# Patient Record
Sex: FEMALE | Race: WHITE | NOT HISPANIC OR LATINO | Employment: FULL TIME | URBAN - METROPOLITAN AREA
[De-identification: names, ages, dates, MRNs, and addresses within clinical notes are randomized per-mention and may not be internally consistent; named-entity substitution may affect disease eponyms.]

---

## 2020-12-04 DIAGNOSIS — Z01.84 ANTIBODY RESPONSE EXAMINATION: ICD-10-CM

## 2021-12-21 ENCOUNTER — LAB VISIT (OUTPATIENT)
Dept: PRIMARY CARE CLINIC | Facility: OTHER | Age: 26
End: 2021-12-21
Payer: COMMERCIAL

## 2021-12-21 DIAGNOSIS — J02.9 SORE THROAT: ICD-10-CM

## 2021-12-21 DIAGNOSIS — J02.9 SORE THROAT: Primary | ICD-10-CM

## 2021-12-21 LAB
CTP QC/QA: YES
SARS-COV-2 RDRP RESP QL NAA+PROBE: NEGATIVE

## 2021-12-21 PROCEDURE — U0002 COVID-19 LAB TEST NON-CDC: HCPCS | Mod: QW,,, | Performed by: PREVENTIVE MEDICINE

## 2021-12-21 PROCEDURE — U0002: ICD-10-PCS | Mod: QW,,, | Performed by: PREVENTIVE MEDICINE

## 2022-01-20 ENCOUNTER — TELEPHONE (OUTPATIENT)
Dept: OBSTETRICS AND GYNECOLOGY | Facility: CLINIC | Age: 27
End: 2022-01-20
Payer: COMMERCIAL

## 2022-01-20 DIAGNOSIS — Z34.90 PREGNANCY, UNSPECIFIED GESTATIONAL AGE: Primary | ICD-10-CM

## 2022-01-31 ENCOUNTER — LAB VISIT (OUTPATIENT)
Dept: LAB | Facility: HOSPITAL | Age: 27
End: 2022-01-31
Attending: OBSTETRICS & GYNECOLOGY
Payer: COMMERCIAL

## 2022-01-31 ENCOUNTER — TELEPHONE (OUTPATIENT)
Dept: OBSTETRICS AND GYNECOLOGY | Facility: CLINIC | Age: 27
End: 2022-01-31
Payer: COMMERCIAL

## 2022-01-31 DIAGNOSIS — O20.9 VAGINAL BLEEDING AFFECTING EARLY PREGNANCY: Primary | ICD-10-CM

## 2022-01-31 DIAGNOSIS — O20.9 VAGINAL BLEEDING AFFECTING EARLY PREGNANCY: ICD-10-CM

## 2022-01-31 LAB
HCG INTACT+B SERPL-ACNC: 23 MIU/ML
PROGEST SERPL-MCNC: 0.7 NG/ML

## 2022-01-31 PROCEDURE — 84702 CHORIONIC GONADOTROPIN TEST: CPT | Performed by: OBSTETRICS & GYNECOLOGY

## 2022-01-31 PROCEDURE — 36415 COLL VENOUS BLD VENIPUNCTURE: CPT | Performed by: OBSTETRICS & GYNECOLOGY

## 2022-01-31 PROCEDURE — 84144 ASSAY OF PROGESTERONE: CPT | Performed by: OBSTETRICS & GYNECOLOGY

## 2022-01-31 NOTE — TELEPHONE ENCOUNTER
Scheduled pt per Dr. Moon.  Since getting off of the phone, pt has passed more clots.  Thinks she miscarried.    Advised we will confirm with labs that she will do today.

## 2022-01-31 NOTE — TELEPHONE ENCOUNTER
NEW pt, ~5 and 6 OB pt c/o that last night when she wiped, she had some light pink spotting.  Throughout the night the spotting turned to a darker brown and then before bed it was a bright red.  Also reports cramps last night.  Took Tylenol and use a heat pad for discomfort.  This morning, cramping has subsided but spotting is still present only when she urinates or wiping.  Had intercourse 5 days ago.    Confirm appt on 2/18    Advised that in general, spotting and cramping can be normal in early pregnancy d/t the vascularity of the cervix during pregnancy.  Advised to increase fluids.  Will check with Dr. Moon to see if she wants labs or to come in sooner.    Please advise, thanks!

## 2022-02-01 ENCOUNTER — LAB VISIT (OUTPATIENT)
Dept: LAB | Facility: HOSPITAL | Age: 27
End: 2022-02-01
Attending: OBSTETRICS & GYNECOLOGY
Payer: COMMERCIAL

## 2022-02-01 ENCOUNTER — OFFICE VISIT (OUTPATIENT)
Dept: OBSTETRICS AND GYNECOLOGY | Facility: CLINIC | Age: 27
End: 2022-02-01
Payer: COMMERCIAL

## 2022-02-01 VITALS
WEIGHT: 119.38 LBS | BODY MASS INDEX: 22.54 KG/M2 | HEIGHT: 61 IN | SYSTOLIC BLOOD PRESSURE: 98 MMHG | DIASTOLIC BLOOD PRESSURE: 70 MMHG

## 2022-02-01 DIAGNOSIS — O03.9 MISCARRIAGE: ICD-10-CM

## 2022-02-01 DIAGNOSIS — Z67.91 RH NEGATIVE STATE IN ANTEPARTUM PERIOD: ICD-10-CM

## 2022-02-01 DIAGNOSIS — O26.899 RH NEGATIVE STATE IN ANTEPARTUM PERIOD: ICD-10-CM

## 2022-02-01 DIAGNOSIS — N92.6 MISSED PERIOD: Primary | ICD-10-CM

## 2022-02-01 DIAGNOSIS — N92.6 MISSED PERIOD: ICD-10-CM

## 2022-02-01 LAB
ABO + RH BLD: NORMAL
BLD GP AB SCN CELLS X3 SERPL QL: NORMAL

## 2022-02-01 PROCEDURE — 99203 OFFICE O/P NEW LOW 30 MIN: CPT | Mod: 25,S$GLB,, | Performed by: OBSTETRICS & GYNECOLOGY

## 2022-02-01 PROCEDURE — 3078F DIAST BP <80 MM HG: CPT | Mod: CPTII,S$GLB,, | Performed by: OBSTETRICS & GYNECOLOGY

## 2022-02-01 PROCEDURE — 3074F SYST BP LT 130 MM HG: CPT | Mod: CPTII,S$GLB,, | Performed by: OBSTETRICS & GYNECOLOGY

## 2022-02-01 PROCEDURE — 96372 THER/PROPH/DIAG INJ SC/IM: CPT | Mod: S$GLB,,, | Performed by: OBSTETRICS & GYNECOLOGY

## 2022-02-01 PROCEDURE — 1159F MED LIST DOCD IN RCRD: CPT | Mod: CPTII,S$GLB,, | Performed by: OBSTETRICS & GYNECOLOGY

## 2022-02-01 PROCEDURE — 86901 BLOOD TYPING SEROLOGIC RH(D): CPT | Performed by: OBSTETRICS & GYNECOLOGY

## 2022-02-01 PROCEDURE — 3078F PR MOST RECENT DIASTOLIC BLOOD PRESSURE < 80 MM HG: ICD-10-PCS | Mod: CPTII,S$GLB,, | Performed by: OBSTETRICS & GYNECOLOGY

## 2022-02-01 PROCEDURE — 99999 PR PBB SHADOW E&M-EST. PATIENT-LVL III: ICD-10-PCS | Mod: PBBFAC,,, | Performed by: OBSTETRICS & GYNECOLOGY

## 2022-02-01 PROCEDURE — 3074F PR MOST RECENT SYSTOLIC BLOOD PRESSURE < 130 MM HG: ICD-10-PCS | Mod: CPTII,S$GLB,, | Performed by: OBSTETRICS & GYNECOLOGY

## 2022-02-01 PROCEDURE — 1160F RVW MEDS BY RX/DR IN RCRD: CPT | Mod: CPTII,S$GLB,, | Performed by: OBSTETRICS & GYNECOLOGY

## 2022-02-01 PROCEDURE — 3008F BODY MASS INDEX DOCD: CPT | Mod: CPTII,S$GLB,, | Performed by: OBSTETRICS & GYNECOLOGY

## 2022-02-01 PROCEDURE — 3008F PR BODY MASS INDEX (BMI) DOCUMENTED: ICD-10-PCS | Mod: CPTII,S$GLB,, | Performed by: OBSTETRICS & GYNECOLOGY

## 2022-02-01 PROCEDURE — 99203 PR OFFICE/OUTPT VISIT, NEW, LEVL III, 30-44 MIN: ICD-10-PCS | Mod: 25,S$GLB,, | Performed by: OBSTETRICS & GYNECOLOGY

## 2022-02-01 PROCEDURE — 96372 RHO (D) IMMUNE GLOBULIN: ICD-10-PCS | Mod: S$GLB,,, | Performed by: OBSTETRICS & GYNECOLOGY

## 2022-02-01 PROCEDURE — 99999 PR PBB SHADOW E&M-EST. PATIENT-LVL III: CPT | Mod: PBBFAC,,, | Performed by: OBSTETRICS & GYNECOLOGY

## 2022-02-01 PROCEDURE — 1159F PR MEDICATION LIST DOCUMENTED IN MEDICAL RECORD: ICD-10-PCS | Mod: CPTII,S$GLB,, | Performed by: OBSTETRICS & GYNECOLOGY

## 2022-02-01 PROCEDURE — 86850 RBC ANTIBODY SCREEN: CPT | Performed by: OBSTETRICS & GYNECOLOGY

## 2022-02-01 PROCEDURE — 1160F PR REVIEW ALL MEDS BY PRESCRIBER/CLIN PHARMACIST DOCUMENTED: ICD-10-PCS | Mod: CPTII,S$GLB,, | Performed by: OBSTETRICS & GYNECOLOGY

## 2022-02-01 RX ORDER — CITALOPRAM 20 MG/1
20 TABLET, FILM COATED ORAL DAILY
COMMUNITY
Start: 2021-09-25 | End: 2022-03-31

## 2022-02-01 NOTE — PROGRESS NOTES
Ordering Provider: Dr. Moon     Patient here to receive Rhogam to RIGHT upper outer glute. Tolerated well, no reaction noted. Instructed to wait 15 minutes after administration for monitoring.      Pre pain scale: none     Post pain scale: none

## 2022-02-01 NOTE — PROGRESS NOTES
Subjective:       Patient ID: Leticia Pisano is a 26 y.o. female.    Chief Complaint:  Possible Pregnancy and Injections (Rhogam)      History of Present Illness  27 yo  with +UPT 22 and bleeding/cramping yesterday like a period. She reports passed several clots yesterday afternoon/evening. Now only spotting and no cramps. She has been off OCP since August and reported cycles for remainder of year were about 40 days apart. She did an ovulation test after LMP 21 and was positive OPK on  (day 18 of cycle). She has a history of anxiety and took Celexa but stopped two months ago and doing well off medication. Patient reports blood type is Rh negative.  with her today at visit.    OB History        1    Para        Term                AB        Living           SAB        IAB        Ectopic        Multiple        Live Births                     GYN History  Age of Menarche:14  No abnormal pap or STDs    Past Medical History:   Diagnosis Date    Anxiety        Past Surgical History:   Procedure Laterality Date    AUGMENTATION OF BREAST          Family History   Problem Relation Age of Onset    Diabetes Father         Social History     Socioeconomic History    Marital status:    Tobacco Use    Smoking status: Never Smoker    Smokeless tobacco: Never Used   Substance and Sexual Activity    Alcohol use: Never    Drug use: Never    Sexual activity: Yes     Partners: Male     Birth control/protection: None        Review of Systems  Review of Systems   Constitutional: Negative for chills and fever.   Respiratory: Negative for chest tightness and shortness of breath.    Cardiovascular: Negative for chest pain.   Gastrointestinal: Negative for abdominal distention, abdominal pain, constipation, diarrhea, nausea and vomiting.   Endocrine: Negative for cold intolerance and heat intolerance.   Genitourinary: Positive for pelvic pain (yesterday with heavy bleeding) and  "vaginal bleeding. Negative for dyspareunia, frequency, urgency and vaginal discharge.   Skin: Negative for rash.   Hematological: Does not bruise/bleed easily.   Psychiatric/Behavioral: Negative for dysphoric mood. The patient is not nervous/anxious.         Objective:     Vitals:    22 0848   BP: 98/70   Weight: 54.2 kg (119 lb 6.1 oz)   Height: 5' 1" (1.549 m)       Physical Exam:   Constitutional: She is oriented to person, place, and time. She appears well-developed and well-nourished. No distress.       Cardiovascular: Normal rate and regular rhythm.     Pulmonary/Chest: Effort normal and breath sounds normal.        Abdominal: Soft. There is no abdominal tenderness.     Genitourinary:    Inguinal canal and rectum normal.      Pelvic exam was performed with patient supine.   The external female genitalia was normal.   No external genitalia lesions identified,Genitalia hair distrobution normal .   There is no rash or lesion on the right labia. There is no rash or lesion on the left labia. Right adnexum displays no mass, no tenderness and no fullness. Left adnexum displays no mass, no tenderness and no fullness. There is bleeding (dark blood small clots in vault) in the vagina. No  no vaginal discharge in the vagina. Cervix exhibits no motion tenderness and no discharge. Uterus is not enlarged and not tender. Urethral Meatus exhibits: urethral lesionUrethra findings: no tendernessBladder findings: no bladder tenderness              Neurological: She is alert and oriented to person, place, and time.    Skin: Skin is warm and dry. No pallor.    Psychiatric: She has a normal mood and affect.     (22)  Quant hc/Progesterone: 0.7    Assessment/ Plan:     Orders Placed This Encounter    Rho(D) Immune Globulin    POCT urine pregnancy    Stoney test, indirect, qualitative    ABO/Rh       Leticia was seen today for possible pregnancy and injections.    Diagnoses and all orders for this visit:    Missed " period  -     POCT urine pregnancy  -     Stoney test, indirect, qualitative; Future  -     ABO/Rh; Future    Rh negative state in antepartum period  -     Stoney test, indirect, qualitative; Future  -     ABO/Rh; Future  -     Rho(D) Immune Globulin    Miscarriage    Counseled on miscarriage as heavy bleeding/discharge with faint UPT and hcg of 23 yesterday afternoon. Discussed causes and expectations. Desires pregnancy and declines contraception. Patient knows Rh negative. Rhogam today and blood type done. Patient to notify us if no cycle in next 4-6 weeks. Advised to start trying again if desired after next cycle. No signs or symptoms of anemia. Patient appropriately upset by news but doing well off medication for anxiety and does not desire to restart at this time.     Follow up in about 6 months (around 8/1/2022) for Annual or sooner prn pregnancy.       Maren Moon MD    As of April 1, 2021, the Cures Act has been passed nationally. This new law requires that all doctors progress notes, lab results, pathology reports and radiology reports be released IMMEDIATELY to the patient in the patient portal. That means that the results are released to you at the EXACT same time they are released to me. Therefore, with all of the patients that I have I am not able to reply to each patient exactly when the results come in. So there will be a delay from when you see the results to when I see them and have time to come up with a response to send you. Also I only see these results when I am on the computer at work. So if the results come in over the weekend or after 5 pm of a work day, I will not see them until the next business day. As you can tell, this is a challenge as a physician to give every patient the quick response they hope for and deserve. So please be patient!   Thanks for your understanding and patience.

## 2022-02-02 ENCOUNTER — CLINICAL SUPPORT (OUTPATIENT)
Dept: OTHER | Facility: CLINIC | Age: 27
End: 2022-02-02
Payer: COMMERCIAL

## 2022-02-02 DIAGNOSIS — Z00.8 ENCOUNTER FOR OTHER GENERAL EXAMINATION: ICD-10-CM

## 2022-02-03 VITALS — BODY MASS INDEX: 22.56 KG/M2 | HEIGHT: 61 IN

## 2022-02-03 LAB
GLUCOSE SERPL-MCNC: 89 MG/DL (ref 60–140)
HDLC SERPL-MCNC: 65 MG/DL
POC CHOLESTEROL, LDL (DOCK): 76 MG/DL
POC CHOLESTEROL, TOTAL: 159 MG/DL
TRIGL SERPL-MCNC: 90 MG/DL

## 2022-03-04 ENCOUNTER — TELEPHONE (OUTPATIENT)
Dept: OBSTETRICS AND GYNECOLOGY | Facility: CLINIC | Age: 27
End: 2022-03-04

## 2022-03-04 ENCOUNTER — LAB VISIT (OUTPATIENT)
Dept: LAB | Facility: HOSPITAL | Age: 27
End: 2022-03-04
Attending: OBSTETRICS & GYNECOLOGY
Payer: COMMERCIAL

## 2022-03-04 DIAGNOSIS — Z32.01 POSITIVE PREGNANCY TEST: ICD-10-CM

## 2022-03-04 DIAGNOSIS — Z32.01 POSITIVE PREGNANCY TEST: Primary | ICD-10-CM

## 2022-03-04 LAB
HCG INTACT+B SERPL-ACNC: 71 MIU/ML
PROGEST SERPL-MCNC: 28.5 NG/ML

## 2022-03-04 PROCEDURE — 36415 COLL VENOUS BLD VENIPUNCTURE: CPT | Performed by: OBSTETRICS & GYNECOLOGY

## 2022-03-04 PROCEDURE — 84702 CHORIONIC GONADOTROPIN TEST: CPT | Performed by: OBSTETRICS & GYNECOLOGY

## 2022-03-04 PROCEDURE — 84144 ASSAY OF PROGESTERONE: CPT | Performed by: OBSTETRICS & GYNECOLOGY

## 2022-03-04 NOTE — TELEPHONE ENCOUNTER
Advised per Dr. Moon.  Pt stopped bleeding on 2/5 and had a negative UPT that day and days following as well.  Pt didn't have a period after the miscarriage.     Repeat HCG ordered for Monday.

## 2022-03-04 NOTE — TELEPHONE ENCOUNTER
Pt recently had a miscarriage on 1/31.  Last night had a +UPT.  Had 2 +UPT this AM on two different tests.  Denies VB or pain at this time.      HCG and progesterone labs ordered and [facility]    Do you want to do a repeat HCG on Monday?  Let me know if you recommend anything else.

## 2022-03-07 ENCOUNTER — LAB VISIT (OUTPATIENT)
Dept: LAB | Facility: HOSPITAL | Age: 27
End: 2022-03-07
Attending: OBSTETRICS & GYNECOLOGY
Payer: COMMERCIAL

## 2022-03-07 DIAGNOSIS — Z32.01 POSITIVE PREGNANCY TEST: ICD-10-CM

## 2022-03-07 PROCEDURE — 84702 CHORIONIC GONADOTROPIN TEST: CPT | Performed by: OBSTETRICS & GYNECOLOGY

## 2022-03-08 DIAGNOSIS — Z87.59 HISTORY OF MISCARRIAGE: Primary | ICD-10-CM

## 2022-03-08 LAB — HCG INTACT+B SERPL-ACNC: 311 MIU/ML

## 2022-03-08 NOTE — PROGRESS NOTES
Patient notified of results. Please schedule missed period with dating ultrasound in 2-3 weeks. Please schedule hcg next Monday. Order entered.

## 2022-03-31 ENCOUNTER — OFFICE VISIT (OUTPATIENT)
Dept: OBSTETRICS AND GYNECOLOGY | Facility: CLINIC | Age: 27
End: 2022-03-31
Payer: COMMERCIAL

## 2022-03-31 ENCOUNTER — PROCEDURE VISIT (OUTPATIENT)
Dept: OBSTETRICS AND GYNECOLOGY | Facility: CLINIC | Age: 27
End: 2022-03-31
Payer: COMMERCIAL

## 2022-03-31 VITALS
DIASTOLIC BLOOD PRESSURE: 60 MMHG | HEIGHT: 61 IN | BODY MASS INDEX: 22.87 KG/M2 | SYSTOLIC BLOOD PRESSURE: 110 MMHG | WEIGHT: 121.13 LBS

## 2022-03-31 DIAGNOSIS — Z34.90 PREGNANCY, UNSPECIFIED GESTATIONAL AGE: ICD-10-CM

## 2022-03-31 DIAGNOSIS — O28.3 ABNORMAL FETAL ULTRASOUND: Primary | ICD-10-CM

## 2022-03-31 LAB
B-HCG UR QL: POSITIVE
CTP QC/QA: YES

## 2022-03-31 PROCEDURE — 1160F RVW MEDS BY RX/DR IN RCRD: CPT | Mod: CPTII,S$GLB,, | Performed by: NURSE PRACTITIONER

## 2022-03-31 PROCEDURE — 3008F BODY MASS INDEX DOCD: CPT | Mod: CPTII,S$GLB,, | Performed by: NURSE PRACTITIONER

## 2022-03-31 PROCEDURE — 99999 PR PBB SHADOW E&M-EST. PATIENT-LVL III: CPT | Mod: PBBFAC,,, | Performed by: NURSE PRACTITIONER

## 2022-03-31 PROCEDURE — 87591 N.GONORRHOEAE DNA AMP PROB: CPT | Performed by: NURSE PRACTITIONER

## 2022-03-31 PROCEDURE — 76801 US OB/GYN EXTENDED PROCEDURE (VIEWPOINT): ICD-10-PCS | Mod: S$GLB,,, | Performed by: OBSTETRICS & GYNECOLOGY

## 2022-03-31 PROCEDURE — 99999 PR PBB SHADOW E&M-EST. PATIENT-LVL III: ICD-10-PCS | Mod: PBBFAC,,, | Performed by: NURSE PRACTITIONER

## 2022-03-31 PROCEDURE — 87491 CHLMYD TRACH DNA AMP PROBE: CPT | Performed by: NURSE PRACTITIONER

## 2022-03-31 PROCEDURE — 3074F PR MOST RECENT SYSTOLIC BLOOD PRESSURE < 130 MM HG: ICD-10-PCS | Mod: CPTII,S$GLB,, | Performed by: NURSE PRACTITIONER

## 2022-03-31 PROCEDURE — 3078F DIAST BP <80 MM HG: CPT | Mod: CPTII,S$GLB,, | Performed by: NURSE PRACTITIONER

## 2022-03-31 PROCEDURE — 76801 OB US < 14 WKS SINGLE FETUS: CPT | Mod: S$GLB,,, | Performed by: OBSTETRICS & GYNECOLOGY

## 2022-03-31 PROCEDURE — 99214 PR OFFICE/OUTPT VISIT, EST, LEVL IV, 30-39 MIN: ICD-10-PCS | Mod: S$GLB,,, | Performed by: NURSE PRACTITIONER

## 2022-03-31 PROCEDURE — 1159F MED LIST DOCD IN RCRD: CPT | Mod: CPTII,S$GLB,, | Performed by: NURSE PRACTITIONER

## 2022-03-31 PROCEDURE — 99214 OFFICE O/P EST MOD 30 MIN: CPT | Mod: S$GLB,,, | Performed by: NURSE PRACTITIONER

## 2022-03-31 PROCEDURE — 1160F PR REVIEW ALL MEDS BY PRESCRIBER/CLIN PHARMACIST DOCUMENTED: ICD-10-PCS | Mod: CPTII,S$GLB,, | Performed by: NURSE PRACTITIONER

## 2022-03-31 PROCEDURE — 81025 POCT URINE PREGNANCY: ICD-10-PCS | Mod: S$GLB,,, | Performed by: NURSE PRACTITIONER

## 2022-03-31 PROCEDURE — 3078F PR MOST RECENT DIASTOLIC BLOOD PRESSURE < 80 MM HG: ICD-10-PCS | Mod: CPTII,S$GLB,, | Performed by: NURSE PRACTITIONER

## 2022-03-31 PROCEDURE — 1159F PR MEDICATION LIST DOCUMENTED IN MEDICAL RECORD: ICD-10-PCS | Mod: CPTII,S$GLB,, | Performed by: NURSE PRACTITIONER

## 2022-03-31 PROCEDURE — 3008F PR BODY MASS INDEX (BMI) DOCUMENTED: ICD-10-PCS | Mod: CPTII,S$GLB,, | Performed by: NURSE PRACTITIONER

## 2022-03-31 PROCEDURE — 81025 URINE PREGNANCY TEST: CPT | Mod: S$GLB,,, | Performed by: NURSE PRACTITIONER

## 2022-03-31 PROCEDURE — 3074F SYST BP LT 130 MM HG: CPT | Mod: CPTII,S$GLB,, | Performed by: NURSE PRACTITIONER

## 2022-03-31 PROCEDURE — 87086 URINE CULTURE/COLONY COUNT: CPT | Performed by: NURSE PRACTITIONER

## 2022-03-31 NOTE — PROGRESS NOTES
History & Physical  Gynecology      SUBJECTIVE:     Chief Complaint: Pregnancy Confirmation       History of Present Illness: Patient presents to clinic with partner for pregnancy confirmation visit. History of miscarriage in the past 6 months. OBUS with abnormal findings today.       Review of patient's allergies indicates:  No Known Allergies    Past Medical History:   Diagnosis Date    Anxiety      Past Surgical History:   Procedure Laterality Date    AUGMENTATION OF BREAST       OB History        1    Para        Term                AB        Living           SAB        IAB        Ectopic        Multiple        Live Births                   Family History   Problem Relation Age of Onset    Diabetes Father      Social History     Tobacco Use    Smoking status: Never Smoker    Smokeless tobacco: Never Used   Substance Use Topics    Alcohol use: Never    Drug use: Never       Current Outpatient Medications   Medication Sig    prenatal 25/iron fum/folic/dha (PRENATAL-1 ORAL) Take by mouth.     No current facility-administered medications for this visit.         Review of Systems:  Review of Systems   Constitutional: Negative for activity change, appetite change, chills, fatigue and fever.   HENT: Negative for mouth sores.    Eyes: Negative for visual disturbance.   Respiratory: Negative for cough and shortness of breath.    Cardiovascular: Negative for chest pain and leg swelling.   Gastrointestinal: Negative for abdominal pain, constipation, diarrhea, nausea, vomiting and reflux.   Endocrine: Negative for hyperthyroidism and hypothyroidism.   Genitourinary: Negative for dysuria, flank pain, frequency, genital sores, hematuria, menstrual problem, pelvic pain, vaginal bleeding, vaginal discharge, vaginal pain, vaginal dryness and vaginal odor.   Musculoskeletal: Negative for arthralgias, back pain and myalgias.   Integumentary:  Negative for rash, breast mass and breast tenderness.    Neurological: Negative for headaches.   Hematological: Negative for adenopathy. Does not bruise/bleed easily.   Psychiatric/Behavioral: Negative for depression. The patient is not nervous/anxious.    Breast: Negative for asymmetry, mass and tenderness       OBJECTIVE:     Physical Exam:  Physical Exam  Vitals and nursing note reviewed.   Constitutional:       Appearance: Normal appearance.   HENT:      Head: Normocephalic and atraumatic.      Nose: Nose normal.      Mouth/Throat:      Mouth: Mucous membranes are moist.   Eyes:      Conjunctiva/sclera: Conjunctivae normal.   Cardiovascular:      Rate and Rhythm: Normal rate.   Pulmonary:      Effort: Pulmonary effort is normal.      Breath sounds: Normal breath sounds.   Abdominal:      Palpations: Abdomen is soft.   Genitourinary:     Comments: Deferred  Musculoskeletal:         General: Normal range of motion.      Cervical back: Normal range of motion.   Skin:     General: Skin is warm and dry.   Neurological:      General: No focal deficit present.      Mental Status: She is alert.   Psychiatric:         Mood and Affect: Mood normal.         Behavior: Behavior normal.         Thought Content: Thought content normal.         Judgment: Judgment normal.           ASSESSMENT:       ICD-10-CM ICD-9-CM    1. Abnormal fetal ultrasound  O28.3 796.5 Ambulatory referral/consult to Perinatology   2. Pregnancy, unspecified gestational age  Z34.90 V22.2 Urine culture      POCT urine pregnancy      C. trachomatis/N. gonorrhoeae by AMP DNA [facility]; Urine      Ambulatory referral/consult to Perinatology          Plan:      TVUS noted IUP- suspicion for conjoined twin pregnancy vs monoamniotic pregnancy with overlapping of fetal positioning. Singular heart beat is identified.     Discussed need for repeat imaging in 1-2 weeks, will assist to schedule with [facility] for visit and scan. Emotional support given.    FU with NP as needed.    Counseling time: 30 minutes      Dottie CABALLERO  PASCUAL Nieves

## 2022-04-01 LAB
BACTERIA UR CULT: NORMAL
C TRACH DNA SPEC QL NAA+PROBE: NOT DETECTED
N GONORRHOEA DNA SPEC QL NAA+PROBE: NOT DETECTED

## 2022-04-06 ENCOUNTER — PATIENT MESSAGE (OUTPATIENT)
Dept: MATERNAL FETAL MEDICINE | Facility: CLINIC | Age: 27
End: 2022-04-06
Payer: COMMERCIAL

## 2022-04-07 ENCOUNTER — PROCEDURE VISIT (OUTPATIENT)
Dept: MATERNAL FETAL MEDICINE | Facility: CLINIC | Age: 27
End: 2022-04-07
Payer: COMMERCIAL

## 2022-04-07 ENCOUNTER — OFFICE VISIT (OUTPATIENT)
Dept: MATERNAL FETAL MEDICINE | Facility: CLINIC | Age: 27
End: 2022-04-07
Payer: COMMERCIAL

## 2022-04-07 VITALS
BODY MASS INDEX: 23.11 KG/M2 | DIASTOLIC BLOOD PRESSURE: 72 MMHG | SYSTOLIC BLOOD PRESSURE: 110 MMHG | HEIGHT: 61 IN | WEIGHT: 122.38 LBS

## 2022-04-07 DIAGNOSIS — O28.3 ABNORMAL FETAL ULTRASOUND: ICD-10-CM

## 2022-04-07 DIAGNOSIS — O30.021: ICD-10-CM

## 2022-04-07 PROCEDURE — 3074F SYST BP LT 130 MM HG: CPT | Mod: CPTII,S$GLB,, | Performed by: OBSTETRICS & GYNECOLOGY

## 2022-04-07 PROCEDURE — 99204 OFFICE O/P NEW MOD 45 MIN: CPT | Mod: 25,S$GLB,, | Performed by: OBSTETRICS & GYNECOLOGY

## 2022-04-07 PROCEDURE — 1159F MED LIST DOCD IN RCRD: CPT | Mod: CPTII,S$GLB,, | Performed by: OBSTETRICS & GYNECOLOGY

## 2022-04-07 PROCEDURE — 99204 PR OFFICE/OUTPT VISIT, NEW, LEVL IV, 45-59 MIN: ICD-10-PCS | Mod: 25,S$GLB,, | Performed by: OBSTETRICS & GYNECOLOGY

## 2022-04-07 PROCEDURE — 76377 PR  3D RENDERING W/ IMAGE POSTPROCESS: ICD-10-PCS | Mod: S$GLB,,, | Performed by: OBSTETRICS & GYNECOLOGY

## 2022-04-07 PROCEDURE — 76801 OB US < 14 WKS SINGLE FETUS: CPT | Mod: S$GLB,,, | Performed by: OBSTETRICS & GYNECOLOGY

## 2022-04-07 PROCEDURE — 99999 PR PBB SHADOW E&M-EST. PATIENT-LVL III: CPT | Mod: PBBFAC,,, | Performed by: OBSTETRICS & GYNECOLOGY

## 2022-04-07 PROCEDURE — 3078F DIAST BP <80 MM HG: CPT | Mod: CPTII,S$GLB,, | Performed by: OBSTETRICS & GYNECOLOGY

## 2022-04-07 PROCEDURE — 3008F BODY MASS INDEX DOCD: CPT | Mod: CPTII,S$GLB,, | Performed by: OBSTETRICS & GYNECOLOGY

## 2022-04-07 PROCEDURE — 3078F PR MOST RECENT DIASTOLIC BLOOD PRESSURE < 80 MM HG: ICD-10-PCS | Mod: CPTII,S$GLB,, | Performed by: OBSTETRICS & GYNECOLOGY

## 2022-04-07 PROCEDURE — 1159F PR MEDICATION LIST DOCUMENTED IN MEDICAL RECORD: ICD-10-PCS | Mod: CPTII,S$GLB,, | Performed by: OBSTETRICS & GYNECOLOGY

## 2022-04-07 PROCEDURE — 99999 PR PBB SHADOW E&M-EST. PATIENT-LVL III: ICD-10-PCS | Mod: PBBFAC,,, | Performed by: OBSTETRICS & GYNECOLOGY

## 2022-04-07 PROCEDURE — 3008F PR BODY MASS INDEX (BMI) DOCUMENTED: ICD-10-PCS | Mod: CPTII,S$GLB,, | Performed by: OBSTETRICS & GYNECOLOGY

## 2022-04-07 PROCEDURE — 76377 3D RENDER W/INTRP POSTPROCES: CPT | Mod: S$GLB,,, | Performed by: OBSTETRICS & GYNECOLOGY

## 2022-04-07 PROCEDURE — 3074F PR MOST RECENT SYSTOLIC BLOOD PRESSURE < 130 MM HG: ICD-10-PCS | Mod: CPTII,S$GLB,, | Performed by: OBSTETRICS & GYNECOLOGY

## 2022-04-07 PROCEDURE — 76801 PR US, OB <14WKS, TRANSABD, SINGLE GESTATION: ICD-10-PCS | Mod: S$GLB,,, | Performed by: OBSTETRICS & GYNECOLOGY

## 2022-04-07 NOTE — PROGRESS NOTES
Obstetrical ultrasound and consultation completed today.  See full ultrasound report in imaging section of [facility].      Consultation  ==========    45 minutes of total time was spent on the encounter which included face-to-face time and non-face-to-face time preparing to see the patient,  obtaining and or reviewing separately obtained history, documenting clinical information in the electronic or other health record, independently  interpreting results (not separately reported) and communicating results to the patient, her partner, and her mother or care coordination (not  separately reported).    Referring MD: Dr. Moon    Indication for referral: suspected monoamniotic twin IUP vs. conjoined twin pregnancy on dating scan    Both 2D and 3D ultrasound were used today to assess the type of twin pregnancy. A single amniotic sac containing two embryos is identified.  The embryos are joined at the chest and abdominal wall. A single heart is identified.  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  Given the presence of a shared heart, separation of the twins after birth is not possible.  The very poor prognosis for survival associated with this type of twin pregnancy was discussed.  Pregnancy management options, including TAB vs. pregnancy continuation, were reviewed nondirectively.    Findings were also discussed with Dr. Escobar and Dr. Moon.    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 A single heart is present, and the thoraces and abdominal wall  appear to be unified between the two embryos. See representative images in the PDF version of this report.      Addendum entered on 4/18/22:  Given the finding of shared/single heart, this type of conjoined twin pregnancy is incompatible with life (lethal fetal anomaly). Additionally, were the pregnancy to continue to term or near term, this would entail significant maternal morbidity in that  delivery would need to be accomplished via a classical , which carries significant risk for uterine rupture, an abnormally adherent placenta,  delivery, and maternal hemorrhage/morbidity/mortality in subsequent pregnancies.  The patient has elected to undergo TAB.

## 2022-04-08 RX ORDER — CITALOPRAM 10 MG/1
10 TABLET ORAL DAILY
Qty: 90 TABLET | Refills: 3 | Status: SHIPPED | OUTPATIENT
Start: 2022-04-08 | End: 2022-04-18 | Stop reason: SDUPTHER

## 2022-04-08 RX ORDER — ALPRAZOLAM 0.25 MG/1
0.25 TABLET ORAL 3 TIMES DAILY PRN
Qty: 30 TABLET | Refills: 0 | Status: SHIPPED | OUTPATIENT
Start: 2022-04-08 | End: 2022-08-22

## 2022-04-13 ENCOUNTER — PATIENT MESSAGE (OUTPATIENT)
Dept: MATERNAL FETAL MEDICINE | Facility: CLINIC | Age: 27
End: 2022-04-13
Payer: COMMERCIAL

## 2022-04-18 ENCOUNTER — TELEPHONE (OUTPATIENT)
Dept: OBSTETRICS AND GYNECOLOGY | Facility: CLINIC | Age: 27
End: 2022-04-18
Payer: COMMERCIAL

## 2022-04-18 RX ORDER — CITALOPRAM 10 MG/1
10 TABLET ORAL DAILY
Qty: 90 TABLET | Refills: 3 | Status: SHIPPED | OUTPATIENT
Start: 2022-04-18 | End: 2022-11-22

## 2022-05-11 ENCOUNTER — PATIENT MESSAGE (OUTPATIENT)
Dept: ADMINISTRATIVE | Facility: OTHER | Age: 27
End: 2022-05-11
Payer: COMMERCIAL

## 2022-05-17 ENCOUNTER — LAB VISIT (OUTPATIENT)
Dept: LAB | Facility: HOSPITAL | Age: 27
End: 2022-05-17
Attending: OBSTETRICS & GYNECOLOGY
Payer: COMMERCIAL

## 2022-05-17 ENCOUNTER — TELEPHONE (OUTPATIENT)
Dept: OBSTETRICS AND GYNECOLOGY | Facility: CLINIC | Age: 27
End: 2022-05-17
Payer: COMMERCIAL

## 2022-05-17 DIAGNOSIS — Z87.59 HISTORY OF MISCARRIAGE: ICD-10-CM

## 2022-05-17 LAB — HCG INTACT+B SERPL-ACNC: 75 MIU/ML

## 2022-05-17 PROCEDURE — 84702 CHORIONIC GONADOTROPIN TEST: CPT | Performed by: OBSTETRICS & GYNECOLOGY

## 2022-05-17 PROCEDURE — 36415 COLL VENOUS BLD VENIPUNCTURE: CPT | Performed by: OBSTETRICS & GYNECOLOGY

## 2022-05-17 NOTE — TELEPHONE ENCOUNTER
----- Message from Maren Moon MD sent at 5/16/2022  9:46 AM CDT -----  Regarding: hcg  She sent me a message over the weekend now 3 weeks postop D&C with still positive UPT. Can you please set her up for an hcg level this week whenever she can get done?    Thanks,  Dr. Moon

## 2022-05-18 DIAGNOSIS — Z87.59 HISTORY OF MISCARRIAGE: Primary | ICD-10-CM

## 2022-05-25 ENCOUNTER — LAB VISIT (OUTPATIENT)
Dept: LAB | Facility: HOSPITAL | Age: 27
End: 2022-05-25
Attending: OBSTETRICS & GYNECOLOGY
Payer: COMMERCIAL

## 2022-05-25 DIAGNOSIS — Z87.59 HISTORY OF MISCARRIAGE: ICD-10-CM

## 2022-05-25 LAB — HCG INTACT+B SERPL-ACNC: 33 MIU/ML

## 2022-05-25 PROCEDURE — 84702 CHORIONIC GONADOTROPIN TEST: CPT | Performed by: OBSTETRICS & GYNECOLOGY

## 2022-05-25 PROCEDURE — 36415 COLL VENOUS BLD VENIPUNCTURE: CPT | Performed by: OBSTETRICS & GYNECOLOGY

## 2022-06-05 ENCOUNTER — PATIENT MESSAGE (OUTPATIENT)
Dept: OBSTETRICS AND GYNECOLOGY | Facility: CLINIC | Age: 27
End: 2022-06-05
Payer: COMMERCIAL

## 2022-06-05 DIAGNOSIS — Z87.59 HISTORY OF MISCARRIAGE: Primary | ICD-10-CM

## 2022-06-06 ENCOUNTER — LAB VISIT (OUTPATIENT)
Dept: LAB | Facility: HOSPITAL | Age: 27
End: 2022-06-06
Attending: OBSTETRICS & GYNECOLOGY
Payer: COMMERCIAL

## 2022-06-06 DIAGNOSIS — Z87.59 HISTORY OF MISCARRIAGE: ICD-10-CM

## 2022-06-06 LAB — HCG INTACT+B SERPL-ACNC: 14 MIU/ML

## 2022-06-06 PROCEDURE — 36415 COLL VENOUS BLD VENIPUNCTURE: CPT | Performed by: OBSTETRICS & GYNECOLOGY

## 2022-06-06 PROCEDURE — 84702 CHORIONIC GONADOTROPIN TEST: CPT | Performed by: OBSTETRICS & GYNECOLOGY

## 2022-06-14 DIAGNOSIS — Z87.59 HISTORY OF MISCARRIAGE: Primary | ICD-10-CM

## 2022-07-27 ENCOUNTER — PATIENT MESSAGE (OUTPATIENT)
Dept: OBSTETRICS AND GYNECOLOGY | Facility: CLINIC | Age: 27
End: 2022-07-27
Payer: COMMERCIAL

## 2022-08-03 ENCOUNTER — PATIENT MESSAGE (OUTPATIENT)
Dept: ADMINISTRATIVE | Facility: OTHER | Age: 27
End: 2022-08-03
Payer: COMMERCIAL

## 2022-08-08 ENCOUNTER — TELEPHONE (OUTPATIENT)
Dept: OBSTETRICS AND GYNECOLOGY | Facility: CLINIC | Age: 27
End: 2022-08-08
Payer: COMMERCIAL

## 2022-08-08 DIAGNOSIS — Z34.90 PREGNANCY, UNSPECIFIED GESTATIONAL AGE: ICD-10-CM

## 2022-08-08 DIAGNOSIS — N92.6 MISSED PERIOD: Primary | ICD-10-CM

## 2022-08-09 ENCOUNTER — LAB VISIT (OUTPATIENT)
Dept: LAB | Facility: HOSPITAL | Age: 27
End: 2022-08-09
Attending: OBSTETRICS & GYNECOLOGY
Payer: COMMERCIAL

## 2022-08-09 DIAGNOSIS — N92.6 MISSED PERIOD: ICD-10-CM

## 2022-08-09 LAB
HCG INTACT+B SERPL-ACNC: 289 MIU/ML
PROGEST SERPL-MCNC: 27.9 NG/ML

## 2022-08-09 PROCEDURE — 84144 ASSAY OF PROGESTERONE: CPT | Performed by: OBSTETRICS & GYNECOLOGY

## 2022-08-09 PROCEDURE — 84702 CHORIONIC GONADOTROPIN TEST: CPT | Performed by: OBSTETRICS & GYNECOLOGY

## 2022-08-09 PROCEDURE — 36415 COLL VENOUS BLD VENIPUNCTURE: CPT | Performed by: OBSTETRICS & GYNECOLOGY

## 2022-08-11 ENCOUNTER — PATIENT MESSAGE (OUTPATIENT)
Dept: OBSTETRICS AND GYNECOLOGY | Facility: CLINIC | Age: 27
End: 2022-08-11
Payer: COMMERCIAL

## 2022-08-11 ENCOUNTER — LAB VISIT (OUTPATIENT)
Dept: LAB | Facility: HOSPITAL | Age: 27
End: 2022-08-11
Attending: OBSTETRICS & GYNECOLOGY
Payer: COMMERCIAL

## 2022-08-11 DIAGNOSIS — Z87.59 HISTORY OF MISCARRIAGE: ICD-10-CM

## 2022-08-11 LAB — HCG INTACT+B SERPL-ACNC: 767 MIU/ML

## 2022-08-11 PROCEDURE — 84702 CHORIONIC GONADOTROPIN TEST: CPT | Performed by: OBSTETRICS & GYNECOLOGY

## 2022-08-11 PROCEDURE — 36415 COLL VENOUS BLD VENIPUNCTURE: CPT | Performed by: OBSTETRICS & GYNECOLOGY

## 2022-08-13 ENCOUNTER — PATIENT MESSAGE (OUTPATIENT)
Dept: OBSTETRICS AND GYNECOLOGY | Facility: CLINIC | Age: 27
End: 2022-08-13
Payer: COMMERCIAL

## 2022-08-13 DIAGNOSIS — Z34.90 PREGNANCY, UNSPECIFIED GESTATIONAL AGE: Primary | ICD-10-CM

## 2022-08-16 ENCOUNTER — LAB VISIT (OUTPATIENT)
Dept: LAB | Facility: HOSPITAL | Age: 27
End: 2022-08-16
Attending: OBSTETRICS & GYNECOLOGY
Payer: COMMERCIAL

## 2022-08-16 DIAGNOSIS — Z34.90 PREGNANCY, UNSPECIFIED GESTATIONAL AGE: ICD-10-CM

## 2022-08-16 DIAGNOSIS — R79.89 LOW SERUM PROGESTERONE: Primary | ICD-10-CM

## 2022-08-16 LAB
HCG INTACT+B SERPL-ACNC: 3546 MIU/ML
PROGEST SERPL-MCNC: 16.8 NG/ML

## 2022-08-16 PROCEDURE — 84144 ASSAY OF PROGESTERONE: CPT | Performed by: OBSTETRICS & GYNECOLOGY

## 2022-08-16 PROCEDURE — 36415 COLL VENOUS BLD VENIPUNCTURE: CPT | Performed by: OBSTETRICS & GYNECOLOGY

## 2022-08-16 PROCEDURE — 84702 CHORIONIC GONADOTROPIN TEST: CPT | Performed by: OBSTETRICS & GYNECOLOGY

## 2022-08-16 RX ORDER — PROGESTERONE 100 MG/1
100 CAPSULE ORAL NIGHTLY
Qty: 30 CAPSULE | Refills: 2 | Status: SHIPPED | OUTPATIENT
Start: 2022-08-16 | End: 2022-11-11

## 2022-08-16 NOTE — PROGRESS NOTES
Called back and reviewed results. Quant hcg rising appropriately. Progesterone redrawn with a drop to 16.8. No vaginal bleeding or cramping noted. Discussed option to supplement with progesterone and desired. Rx sent to pharmacy.

## 2022-08-22 ENCOUNTER — PROCEDURE VISIT (OUTPATIENT)
Dept: OBSTETRICS AND GYNECOLOGY | Facility: CLINIC | Age: 27
End: 2022-08-22
Payer: COMMERCIAL

## 2022-08-22 ENCOUNTER — OFFICE VISIT (OUTPATIENT)
Dept: OBSTETRICS AND GYNECOLOGY | Facility: CLINIC | Age: 27
End: 2022-08-22
Payer: COMMERCIAL

## 2022-08-22 VITALS
SYSTOLIC BLOOD PRESSURE: 100 MMHG | HEIGHT: 61 IN | DIASTOLIC BLOOD PRESSURE: 70 MMHG | WEIGHT: 119.5 LBS | BODY MASS INDEX: 22.56 KG/M2

## 2022-08-22 DIAGNOSIS — Z34.90 PREGNANCY, UNSPECIFIED GESTATIONAL AGE: ICD-10-CM

## 2022-08-22 DIAGNOSIS — N92.6 MISSED PERIOD: ICD-10-CM

## 2022-08-22 DIAGNOSIS — Z34.90 PREGNANCY, UNSPECIFIED GESTATIONAL AGE: Primary | ICD-10-CM

## 2022-08-22 PROCEDURE — 3008F BODY MASS INDEX DOCD: CPT | Mod: CPTII,S$GLB,, | Performed by: OBSTETRICS & GYNECOLOGY

## 2022-08-22 PROCEDURE — 3074F PR MOST RECENT SYSTOLIC BLOOD PRESSURE < 130 MM HG: ICD-10-PCS | Mod: CPTII,S$GLB,, | Performed by: OBSTETRICS & GYNECOLOGY

## 2022-08-22 PROCEDURE — 87591 N.GONORRHOEAE DNA AMP PROB: CPT | Performed by: OBSTETRICS & GYNECOLOGY

## 2022-08-22 PROCEDURE — 99999 PR PBB SHADOW E&M-EST. PATIENT-LVL III: ICD-10-PCS | Mod: PBBFAC,,, | Performed by: OBSTETRICS & GYNECOLOGY

## 2022-08-22 PROCEDURE — 3078F PR MOST RECENT DIASTOLIC BLOOD PRESSURE < 80 MM HG: ICD-10-PCS | Mod: CPTII,S$GLB,, | Performed by: OBSTETRICS & GYNECOLOGY

## 2022-08-22 PROCEDURE — 3008F PR BODY MASS INDEX (BMI) DOCUMENTED: ICD-10-PCS | Mod: CPTII,S$GLB,, | Performed by: OBSTETRICS & GYNECOLOGY

## 2022-08-22 PROCEDURE — 88175 CYTOPATH C/V AUTO FLUID REDO: CPT | Performed by: OBSTETRICS & GYNECOLOGY

## 2022-08-22 PROCEDURE — 99999 PR PBB SHADOW E&M-EST. PATIENT-LVL III: CPT | Mod: PBBFAC,,, | Performed by: OBSTETRICS & GYNECOLOGY

## 2022-08-22 PROCEDURE — 1159F MED LIST DOCD IN RCRD: CPT | Mod: CPTII,S$GLB,, | Performed by: OBSTETRICS & GYNECOLOGY

## 2022-08-22 PROCEDURE — 1160F PR REVIEW ALL MEDS BY PRESCRIBER/CLIN PHARMACIST DOCUMENTED: ICD-10-PCS | Mod: CPTII,S$GLB,, | Performed by: OBSTETRICS & GYNECOLOGY

## 2022-08-22 PROCEDURE — 87086 URINE CULTURE/COLONY COUNT: CPT | Performed by: OBSTETRICS & GYNECOLOGY

## 2022-08-22 PROCEDURE — 99214 PR OFFICE/OUTPT VISIT, EST, LEVL IV, 30-39 MIN: ICD-10-PCS | Mod: S$GLB,,, | Performed by: OBSTETRICS & GYNECOLOGY

## 2022-08-22 PROCEDURE — 87491 CHLMYD TRACH DNA AMP PROBE: CPT | Performed by: OBSTETRICS & GYNECOLOGY

## 2022-08-22 PROCEDURE — 3074F SYST BP LT 130 MM HG: CPT | Mod: CPTII,S$GLB,, | Performed by: OBSTETRICS & GYNECOLOGY

## 2022-08-22 PROCEDURE — 99214 OFFICE O/P EST MOD 30 MIN: CPT | Mod: S$GLB,,, | Performed by: OBSTETRICS & GYNECOLOGY

## 2022-08-22 PROCEDURE — 76817 TRANSVAGINAL US OBSTETRIC: CPT | Mod: S$GLB,,, | Performed by: OBSTETRICS & GYNECOLOGY

## 2022-08-22 PROCEDURE — 76817 US OB/GYN EXTENDED PROCEDURE (VIEWPOINT): ICD-10-PCS | Mod: S$GLB,,, | Performed by: OBSTETRICS & GYNECOLOGY

## 2022-08-22 PROCEDURE — 3078F DIAST BP <80 MM HG: CPT | Mod: CPTII,S$GLB,, | Performed by: OBSTETRICS & GYNECOLOGY

## 2022-08-22 PROCEDURE — 1159F PR MEDICATION LIST DOCUMENTED IN MEDICAL RECORD: ICD-10-PCS | Mod: CPTII,S$GLB,, | Performed by: OBSTETRICS & GYNECOLOGY

## 2022-08-22 PROCEDURE — 1160F RVW MEDS BY RX/DR IN RCRD: CPT | Mod: CPTII,S$GLB,, | Performed by: OBSTETRICS & GYNECOLOGY

## 2022-08-22 NOTE — PROGRESS NOTES
Chief Complaint: Absence of Menses     HPI:      Leticia Mcfadden is a 27 y.o.  who presents complaining of absence of menses.  She reports fatigue, breast tenderness and nausea without vomiting. Denies vaginal bleeding, abnormal discharge or pelvic pain. Taking Prometrium at bedtime daily due to lowering of progesterone level on labs. Requested early labs due to history of miscarriage x 2 with last pregnancy conjoined twins.    GYN Hx:  LMP 22.  Menarche 14 with cycles occurring every 28-30 days and menses lasting 5-7 days.     Flu shot: planned this   ZIKA travel or exposure: none  COVID-19: vax x 1, counseled and will consider    Past Medical History:   Diagnosis Date    Anxiety      Past Surgical History:   Procedure Laterality Date    AUGMENTATION OF BREAST       Social History     Tobacco Use    Smoking status: Never Smoker    Smokeless tobacco: Never Used   Substance Use Topics    Alcohol use: Never    Drug use: Never     Family History   Problem Relation Age of Onset    Diabetes Father      OB History    Para Term  AB Living   3       1 0   SAB IAB Ectopic Multiple Live Births   1              # Outcome Date GA Lbr [place]/2nd Weight Sex Delivery Anes PTL Lv   3 Current            2 SAB 22 6w0d          1                 Current Outpatient Medications:     citalopram (CELEXA) 10 MG tablet, Take 1 tablet (10 mg total) by mouth once daily., Disp: 90 tablet, Rfl: 3    prenatal 25/iron fum/folic/dha (PRENATAL-1 ORAL), Take by mouth., Disp: , Rfl:     progesterone (PROMETRIUM) 100 MG capsule, Take 1 capsule (100 mg total) by mouth nightly., Disp: 30 capsule, Rfl: 2  ROS:     GENERAL: Denies weight gain or weight loss. Feeling well overall.   SKIN: Denies rash or lesions.   BREASTS: Denies lumps or nipple discharge. + tenderness.  ABDOMEN: Denies abdominal pain, constipation, diarrhea. nausea and no vomiting  URINARY: Denies dysuria, hematuria. +  "frequency.  NEUROLOGIC: Denies syncope or weakness.   PSYCHIATRIC: Denies depression, anxiety or mood swings.    Physical Exam:      PHYSICAL EXAM:  /70   Ht 5' 1" (1.549 m)   Wt 54.2 kg (119 lb 7.8 oz)   LMP 2022   Breastfeeding Unknown   BMI 22.58 kg/m²   Body mass index is 22.58 kg/m².     APPEARANCE: Well nourished, well developed, in no acute distress.  PSYCH: Appropriate mood and affect.  EXTREMITIES: No edema.  BREAST: No masses or skin lesions. No axillary lymphadenopathy.  PELVIC: Vulva without lesions. Vagina without lesions, discharge or bleeding. Cervical os closed without lesions, bleeding or discharge. Pap smear done without HPV testing. Cultures obtained.    Imaging:  Indication   ========   Indication: Pregnancy of Unknown Viability     History   ======   Previous Outcomes    3   Para 0   Preg. no. 1   Outcome: miscarriage   Date: 2022   Gest. age 6 w + 0 d   Details: no D&C   Preg. no. 2   Outcome: miscarriage   Gest. age 9 w + 0 d   Details: D&C (conjoined twins)     Method   ======   Transvaginal ultrasound examination. View: Good view     Pregnancy   =========   Torres pregnancy. Number of embryos: 1     Dating   ======   LMP on: 2022   GA by LMP 6 w + 5 d   NATHALIE by LMP: 2023   Ultrasound examination on: 2022   GA by U/S based upon: CRL   GA by U/S 6 w + 0 d   NATHALIE by U/S: 2023   Assigned: based on ultrasound (CRL), selected on 2022   Assigned GA 6 w + 0 d   Assigned NATHALIE: 2023     Assessment   ==========   Gestational sac: visualized   Location: intrauterine   Yolk sac: visualized   Embryo: visualized   CRL 3.5 mm 6w 0d [place]   Cardiac activity: present    bpm     Maternal Structures   ===============   Uterus / Cervix   Uterus: Normal   Cervix: Visualized   Approach: Transvaginal   Ovaries / Tubes / Adnexa   Rt ovary: Normal   Rt ovary D1 24 mm   Rt ovary D2 15 mm   Rt ovary D3 19 mm   Rt ovary Vol 3.6 cmÂ³   Lt ovary: Normal "   Lt ovary D1 27 mm   Lt ovary D2 27 mm   Lt ovary D3 27 mm   Lt ovary Vol 10.4 cmÂ³   Lt ovarian corpus luteum: visualized   Lt ovarian corpus luteum D1 20.0 mm   Lt ovarian corpus luteum D2 20.0 mm   Lt ovarian corpus luteum D3 19.0 mm   Cul de Sac / Bladder / Kidneys / Other   Free fluid: No free fluid visualized     Impression   =========   A ball living IUP is identified. NATHALIE is assigned based on the CRL from today?s study. If other clinical data, such as IVF conception dating or prior ultrasound   assignment of NATHALIE differs from the NATHALIE assigned today, please contact the [facility] department so that this report can be revised to reflect the correct NATHALIE.   The maternal adnexae are normal in appearance. The amount of free fluid seen in the pelvis is within normal physiologic range.     Sonographer: Ross Clayton   Electronically Signed by: Batsheva Dawkins at 2022-10:09    Results for orders placed or performed in visit on 22   HCG, Quantitative   Result Value Ref Range    HCG Quant 3546 See Text mIU/mL   Progesterone   Result Value Ref Range    Progesterone 16.8 See Text ng/mL       UPT positive    Assessment/Plan:       ICD-10-CM ICD-9-CM    1. Pregnancy, unspecified gestational age  Z34.90 V22.2 Hepatitis C Antibody      HIV 1/2 Ag/Ab (4th Gen)      RPR      Hepatitis B surface antigen      Type & Screen      Rubella antibody, IgG      Urine culture      CBC auto differential      US [facility] Procedure ([facility])      TSH      C. trachomatis/N. gonorrhoeae by AMP DNA [facility]; Cervix      Liquid-Based Pap Smear, Screening   2. Missed period  N92.6 626.4 POCT urine pregnancy     RTC 2-4 weeks    Counselin. Patient was counseled today on proper weight gain based on the [employer]'s recommendations based on her pre-pregnancy weight.   2. Discussed foods to avoid in pregnancy (i.e. sushi, fish that are high in mercury, deli meat, and unpasteurized cheeses).   3. Discussed prenatal vitamin  options and folic acid (i.e. stool softener, DHA).   4. Optional genetic testing discussed - patient will let us know if she is interested.   5. Optional carrier screening discussed - patient will let us know if she is interested.  5. Safety of exercise discussed with patient, and continued active lifestyle encouraged.  6. Coronavirus and Zika virus precautions for both patient and her spouse.  7. Normal course of prenatal care reviewed.  8. Medications safe in pregnancy discussed and list provided.    30 minutes of face-to-face discussion occurred during today's visit.     Use of the [facility] Patient Portal discussed and encouraged during today's visit.       Maren Moon MD

## 2022-08-23 LAB
C TRACH DNA SPEC QL NAA+PROBE: NOT DETECTED
N GONORRHOEA DNA SPEC QL NAA+PROBE: NOT DETECTED

## 2022-08-24 LAB — BACTERIA UR CULT: NO GROWTH

## 2022-08-27 LAB
FINAL PATHOLOGIC DIAGNOSIS: NORMAL
Lab: NORMAL

## 2022-09-07 ENCOUNTER — INITIAL PRENATAL (OUTPATIENT)
Dept: OBSTETRICS AND GYNECOLOGY | Facility: CLINIC | Age: 27
End: 2022-09-07
Payer: COMMERCIAL

## 2022-09-07 VITALS
DIASTOLIC BLOOD PRESSURE: 70 MMHG | BODY MASS INDEX: 22.66 KG/M2 | SYSTOLIC BLOOD PRESSURE: 100 MMHG | WEIGHT: 119.94 LBS

## 2022-09-07 DIAGNOSIS — Z3A.09 9 WEEKS GESTATION OF PREGNANCY: ICD-10-CM

## 2022-09-07 DIAGNOSIS — Z34.81 ENCOUNTER FOR SUPERVISION OF OTHER NORMAL PREGNANCY, FIRST TRIMESTER: Primary | ICD-10-CM

## 2022-09-07 DIAGNOSIS — O21.9 NAUSEA AND VOMITING OF PREGNANCY, ANTEPARTUM: ICD-10-CM

## 2022-09-07 PROCEDURE — 99999 PR PBB SHADOW E&M-EST. PATIENT-LVL III: CPT | Mod: PBBFAC,,, | Performed by: OBSTETRICS & GYNECOLOGY

## 2022-09-07 PROCEDURE — 0502F PR SUBSEQUENT PRENATAL CARE: ICD-10-PCS | Mod: CPTII,S$GLB,, | Performed by: OBSTETRICS & GYNECOLOGY

## 2022-09-07 PROCEDURE — 99999 PR PBB SHADOW E&M-EST. PATIENT-LVL III: ICD-10-PCS | Mod: PBBFAC,,, | Performed by: OBSTETRICS & GYNECOLOGY

## 2022-09-07 PROCEDURE — 0502F SUBSEQUENT PRENATAL CARE: CPT | Mod: CPTII,S$GLB,, | Performed by: OBSTETRICS & GYNECOLOGY

## 2022-09-07 NOTE — PROGRESS NOTES
9w0d  Having nausea daily and tried Zofran but did not help much. Discussed eating frequent meals and holding down food/drink daily. Denies vaginal bleeding, abnormal discharge or pelvic pain. Mild occasional cramping. Will add Bonjesta daily.  FHT by bedside ultrasound today at 155 bpm.  Reviewed genetic testing options and desires M21.  Family history of Hemophilia in her brother. 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 Discussed inherited testing and will consider.  Plan genetic testing with prenatal labs in 2 weeks.  Patient desires to follow up in 2 weeks as well due to prior history of poor pregnancy outcomes. All questions answered.  Hx depression/anxiety (assoicated with prior loss): stable on Celexa  RTC 2 weeks

## 2022-09-10 ENCOUNTER — PATIENT MESSAGE (OUTPATIENT)
Dept: OBSTETRICS AND GYNECOLOGY | Facility: CLINIC | Age: 27
End: 2022-09-10
Payer: COMMERCIAL

## 2022-09-20 ENCOUNTER — PATIENT MESSAGE (OUTPATIENT)
Dept: OBSTETRICS AND GYNECOLOGY | Facility: CLINIC | Age: 27
End: 2022-09-20
Payer: COMMERCIAL

## 2022-09-20 DIAGNOSIS — Z14.01 HEMOPHILIA CARRIER: Primary | ICD-10-CM

## 2022-09-23 ENCOUNTER — PATIENT MESSAGE (OUTPATIENT)
Dept: ADMINISTRATIVE | Facility: OTHER | Age: 27
End: 2022-09-23
Payer: COMMERCIAL

## 2022-09-23 ENCOUNTER — ROUTINE PRENATAL (OUTPATIENT)
Dept: OBSTETRICS AND GYNECOLOGY | Facility: CLINIC | Age: 27
End: 2022-09-23
Payer: COMMERCIAL

## 2022-09-23 ENCOUNTER — LAB VISIT (OUTPATIENT)
Dept: LAB | Facility: OTHER | Age: 27
End: 2022-09-23
Attending: OBSTETRICS & GYNECOLOGY
Payer: COMMERCIAL

## 2022-09-23 VITALS — DIASTOLIC BLOOD PRESSURE: 60 MMHG | BODY MASS INDEX: 22.85 KG/M2 | WEIGHT: 120.94 LBS | SYSTOLIC BLOOD PRESSURE: 95 MMHG

## 2022-09-23 DIAGNOSIS — Z34.90 PREGNANCY, UNSPECIFIED GESTATIONAL AGE: ICD-10-CM

## 2022-09-23 DIAGNOSIS — Z34.81 ENCOUNTER FOR SUPERVISION OF OTHER NORMAL PREGNANCY, FIRST TRIMESTER: Primary | ICD-10-CM

## 2022-09-23 DIAGNOSIS — Z3A.11 11 WEEKS GESTATION OF PREGNANCY: ICD-10-CM

## 2022-09-23 LAB
ABO + RH BLD: NORMAL
BASOPHILS # BLD AUTO: 0.03 K/UL (ref 0–0.2)
BASOPHILS NFR BLD: 0.4 % (ref 0–1.9)
BLD GP AB SCN CELLS X3 SERPL QL: NORMAL
DIFFERENTIAL METHOD: NORMAL
EOSINOPHIL # BLD AUTO: 0 K/UL (ref 0–0.5)
EOSINOPHIL NFR BLD: 0.3 % (ref 0–8)
ERYTHROCYTE [DISTWIDTH] IN BLOOD BY AUTOMATED COUNT: 12.9 % (ref 11.5–14.5)
HBV SURFACE AG SERPL QL IA: NORMAL
HCT VFR BLD AUTO: 37.2 % (ref 37–48.5)
HCV AB SERPL QL IA: NORMAL
HGB BLD-MCNC: 12.8 G/DL (ref 12–16)
HIV 1+2 AB+HIV1 P24 AG SERPL QL IA: NORMAL
IMM GRANULOCYTES # BLD AUTO: 0.04 K/UL (ref 0–0.04)
IMM GRANULOCYTES NFR BLD AUTO: 0.5 % (ref 0–0.5)
LYMPHOCYTES # BLD AUTO: 1.7 K/UL (ref 1–4.8)
LYMPHOCYTES NFR BLD: 21.5 % (ref 18–48)
MCH RBC QN AUTO: 30.8 PG (ref 27–31)
MCHC RBC AUTO-ENTMCNC: 34.4 G/DL (ref 32–36)
MCV RBC AUTO: 89 FL (ref 82–98)
MONOCYTES # BLD AUTO: 0.5 K/UL (ref 0.3–1)
MONOCYTES NFR BLD: 6.1 % (ref 4–15)
NEUTROPHILS # BLD AUTO: 5.5 K/UL (ref 1.8–7.7)
NEUTROPHILS NFR BLD: 71.2 % (ref 38–73)
NRBC BLD-RTO: 0 /100 WBC
PLATELET # BLD AUTO: 199 K/UL (ref 150–450)
PMV BLD AUTO: 10 FL (ref 9.2–12.9)
RBC # BLD AUTO: 4.16 M/UL (ref 4–5.4)
TSH SERPL DL<=0.005 MIU/L-ACNC: 2.24 UIU/ML (ref 0.4–4)
WBC # BLD AUTO: 7.73 K/UL (ref 3.9–12.7)

## 2022-09-23 PROCEDURE — 87389 HIV-1 AG W/HIV-1&-2 AB AG IA: CPT | Performed by: OBSTETRICS & GYNECOLOGY

## 2022-09-23 PROCEDURE — 0502F PR SUBSEQUENT PRENATAL CARE: ICD-10-PCS | Mod: CPTII,S$GLB,, | Performed by: OBSTETRICS & GYNECOLOGY

## 2022-09-23 PROCEDURE — 86592 SYPHILIS TEST NON-TREP QUAL: CPT | Performed by: OBSTETRICS & GYNECOLOGY

## 2022-09-23 PROCEDURE — 99999 PR PBB SHADOW E&M-EST. PATIENT-LVL II: ICD-10-PCS | Mod: PBBFAC,,, | Performed by: OBSTETRICS & GYNECOLOGY

## 2022-09-23 PROCEDURE — 0502F SUBSEQUENT PRENATAL CARE: CPT | Mod: CPTII,S$GLB,, | Performed by: OBSTETRICS & GYNECOLOGY

## 2022-09-23 PROCEDURE — 87340 HEPATITIS B SURFACE AG IA: CPT | Performed by: OBSTETRICS & GYNECOLOGY

## 2022-09-23 PROCEDURE — 86803 HEPATITIS C AB TEST: CPT | Performed by: OBSTETRICS & GYNECOLOGY

## 2022-09-23 PROCEDURE — 85025 COMPLETE CBC W/AUTO DIFF WBC: CPT | Performed by: OBSTETRICS & GYNECOLOGY

## 2022-09-23 PROCEDURE — 86762 RUBELLA ANTIBODY: CPT | Performed by: OBSTETRICS & GYNECOLOGY

## 2022-09-23 PROCEDURE — 36415 COLL VENOUS BLD VENIPUNCTURE: CPT | Performed by: OBSTETRICS & GYNECOLOGY

## 2022-09-23 PROCEDURE — 86850 RBC ANTIBODY SCREEN: CPT | Performed by: OBSTETRICS & GYNECOLOGY

## 2022-09-23 PROCEDURE — 99999 PR PBB SHADOW E&M-EST. PATIENT-LVL II: CPT | Mod: PBBFAC,,, | Performed by: OBSTETRICS & GYNECOLOGY

## 2022-09-23 PROCEDURE — 84443 ASSAY THYROID STIM HORMONE: CPT | Performed by: OBSTETRICS & GYNECOLOGY

## 2022-09-23 NOTE — PROGRESS NOTES
11w2d  No complaints. Denies vaginal bleeding, abnormal discharge or pelvic pain.   Planning M21 today along with prenatal labs. Reviewed expectations.  Counseled on [facility] and ordered.  Declines flu shot today.  Still with some nausea but better with more frequent meals. Bonjesta was on back order but not feel needed. Ok to stop Prometrium in next week.  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  RTC 4 weeks

## 2022-09-24 LAB — RPR SER QL: NORMAL

## 2022-09-26 ENCOUNTER — TELEPHONE (OUTPATIENT)
Dept: MATERNAL FETAL MEDICINE | Facility: CLINIC | Age: 27
End: 2022-09-26

## 2022-09-26 ENCOUNTER — OFFICE VISIT (OUTPATIENT)
Dept: MATERNAL FETAL MEDICINE | Facility: CLINIC | Age: 27
End: 2022-09-26
Payer: COMMERCIAL

## 2022-09-26 DIAGNOSIS — Z14.01 HEMOPHILIA CARRIER: ICD-10-CM

## 2022-09-26 LAB
RUBV IGG SER-ACNC: 15.4 IU/ML
RUBV IGG SER-IMP: REACTIVE

## 2022-09-26 PROCEDURE — 99499 UNLISTED E&M SERVICE: CPT | Mod: 95,,, | Performed by: GENETIC COUNSELOR, MS

## 2022-09-26 PROCEDURE — 99499 NO LOS: ICD-10-PCS | Mod: 95,,, | Performed by: GENETIC COUNSELOR, MS

## 2022-09-26 PROCEDURE — 96040 PR GENETIC COUNSELING, EACH 30 MIN: ICD-10-PCS | Mod: 95,,, | Performed by: GENETIC COUNSELOR, MS

## 2022-09-26 PROCEDURE — 96040 PR GENETIC COUNSELING, EACH 30 MIN: CPT | Mod: 95,,, | Performed by: GENETIC COUNSELOR, MS

## 2022-09-26 NOTE — PROGRESS NOTES
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         Each patient to whom he or she provides medical services by telemedicine is:  (1) informed of the relationship between the physician and patient and the respective role of any other health care provider with respect to management of the patient; and (2) notified that he or she may decline to receive medical services by telemedicine and may withdraw from such care at any time.    Notes:     Office Visit - Genetic Counseling Evaluation   Leticia Mcfadden  : 1995  MRN: [id number]  PARTNER NAME: Sumeet    DATE OF SERVICE: 22  TIME SPENT: 25 min    REFERRING PROVIDER: Dr. 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 She came to the appointment alone. Leticia's brother is the index case for this condition in her family, she states that when he was young he had significant symptoms of the condition. She was identified as a carrier early in life and had F8 testing completed in March of this year at a visit to the [facility] bleeding disorders clinic. Leticia has ather common intron 22 inversion; F8 c.[[id number]?_[id number]?inv];[=]. She has never had symptoms or concerns about poor clotting.       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 Leticia has one brother who is the index case with severe hemophilia A, their mother is an obligate carrier but has never had testing to confirm. Leticia's partner has one sister who has rheumatoid arthritis and his father was diagnosed with colorectal cancer recently. Neither sibling has children.    Patient's ancestry is unknown. FOB ancestry is unknown. Consanguinity was denied.     Additional history negative for multiple miscarriages/stillbirth, developmental delay/intellectual disability, and known genetic disorders. Complete pedigree will be linked to this encounter and can be viewed under the Media tab. The information provided is based on the patient and/or their reproductive partner's recollection of the family history and in the absence of complete medical records. If the family history changes or if more information is obtained, they were asked to contact us as this may alter the recommendations or impression of the family history.     PAST TESTING  Patient carrier screening: NA    Reproductive partner carrier screening: NA    Parental Karyotypes: NA    PREGNANCY TESTING  cfDNA for aneuploidy: Drawn 9/23/22    Diagnostic testing: NA    Fetal karyotype: NA    COUNSELING:    Hemophilia A is characterized by deficiency in factor VIII clotting activity that results in prolonged oozing after injuries, tooth extractions, or surgery, and delayed or recurrent bleeding prior to complete wound healing. The age of diagnosis and frequency of bleeding episodes are related to the level of factor VIII clotting activity. If the female is symptomatic (i.e., has baseline factor VIII clotting activity <40%), she will be somewhat protected by the natural rise of factor VIII clotting activity during pregnancy, which may even double by the end of the third trimester. The factor VIII level should be measured in the third trimester to confirm that the level is in the normal range, and if it is not, a plan for factor replacement therapy should be developed. Postpartum factor VIII clotting activity can return to baseline within 48 hours, and postpartum hemorrhage may ensue.     We discussed X-linked inheritance which results in a 50% chance to pass on the F8 Intron 22 inversion to any of her children, she also will have a 50% chance for any of her children to be male or female which results in a 1 in 4 chance of an affected male, a 1 in 4 chance of a carrier female, a 1 in 4 chance of an unaffected female and a 1 in 4 chance of an unaffected male. In addition, males within the same family tend to have similar severity of symptoms.     [place] BA, Benito H, Roslyn Romero S. Hemophilia A. 2000 Sep 21 [Updated 2017 Jun 22]. In: Neftaly PALMER, Rosalie DB, Carmen MEDEIROS, et al., editors. GeneReviews® [Internet]. [place] ([place]): [facility], [place]; 6909-3519. Available from: https://www.ncbi.nlm.nih.gov/books/MFQ4737/    DISCUSSION & IMPRESSION:  Leticia is a 27 y.o. 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 She has never had an episode of bleeding and was  diagnosed after her brother was found to be affected. She was identified as a carrier early in life and had F8 testing completed in March of this year, Leticia has ather common intron 22 inversion; F8 c.[6429+?_6430-?inv];[=].    TESTING OPTIONS    Diagnostic Testing: Pt declined, plans for cord blood evaluation at delivery.     Carrier Screening:Not discussed    Pregnancy Options: NA    Recurrence Risk: Hemophilia A- 50%    Procedures/labs DECLINED today: Diagnostic testing    Leticia stated that she has completed bloodwork for cfDNA screening, we discussed the PPV for fetal sex with this test and the importance of confirming it via ultrasound. She also intends to complete testing via cord blood if the baby is male.      We reviewed Leticia's medical and family history. We discussed basics of genetics and x-linked inheritance. Leticia was understanding of the information discussed in clinic and all questions were answered.     Leticia will be scheduled for an MFM MD visit at the time of her fetal anatomic ultrasound, see that note for additional recommendations and ultrasound findings.     RECOMMENDATIONS:  MFM MD visit with 18-20w ultrasound - MFM staff scheduling  If pt elects cord blood testing at delivery please reach out with any questions regarding coordination    Marisabel Pedro MS, [facility] [place]  Licensed, Certified Genetic Counselor  [facility]

## 2022-09-26 NOTE — TELEPHONE ENCOUNTER
Spoke to patient today to let her know she will need to see the high risk doctor when she has her anatomy scan and that I will call her as soon as the November schedule opens and that we may need to move her appointment around.  Patient verbalized her understanding.      ----- Message from Maren Adler RN sent at 9/26/2022 11:44 AM CDT -----    ----- Message -----  From: Ramila Pedro CGC  Sent: 9/26/2022  11:37 AM CDT  To: Mayela Mcgrath Staff    Can you schedule this patient to see one of the MFM's at the time of her ultrasound?

## 2022-09-30 ENCOUNTER — TELEPHONE (OUTPATIENT)
Dept: OBSTETRICS AND GYNECOLOGY | Facility: HOSPITAL | Age: 27
End: 2022-09-30
Payer: COMMERCIAL

## 2022-10-06 ENCOUNTER — ROUTINE PRENATAL (OUTPATIENT)
Dept: OBSTETRICS AND GYNECOLOGY | Facility: CLINIC | Age: 27
End: 2022-10-06
Payer: COMMERCIAL

## 2022-10-06 VITALS
DIASTOLIC BLOOD PRESSURE: 58 MMHG | WEIGHT: 121.06 LBS | SYSTOLIC BLOOD PRESSURE: 106 MMHG | BODY MASS INDEX: 22.87 KG/M2

## 2022-10-06 DIAGNOSIS — Z3A.13 13 WEEKS GESTATION OF PREGNANCY: Primary | ICD-10-CM

## 2022-10-06 PROCEDURE — 0502F PR SUBSEQUENT PRENATAL CARE: ICD-10-PCS | Mod: CPTII,S$GLB,, | Performed by: NURSE PRACTITIONER

## 2022-10-06 PROCEDURE — 0502F SUBSEQUENT PRENATAL CARE: CPT | Mod: CPTII,S$GLB,, | Performed by: NURSE PRACTITIONER

## 2022-10-06 PROCEDURE — 99999 PR PBB SHADOW E&M-EST. PATIENT-LVL II: ICD-10-PCS | Mod: PBBFAC,,, | Performed by: NURSE PRACTITIONER

## 2022-10-06 PROCEDURE — 99999 PR PBB SHADOW E&M-EST. PATIENT-LVL II: CPT | Mod: PBBFAC,,, | Performed by: NURSE PRACTITIONER

## 2022-10-06 NOTE — PROGRESS NOTES
Presents at 13w1d for ANNABEL. Doing well. Flu shot today. Vscan with FM/FHM. FU in 4 weeks for ANNABEL.

## 2022-10-11 ENCOUNTER — PATIENT MESSAGE (OUTPATIENT)
Dept: MATERNAL FETAL MEDICINE | Facility: CLINIC | Age: 27
End: 2022-10-11
Payer: COMMERCIAL

## 2022-11-11 ENCOUNTER — ROUTINE PRENATAL (OUTPATIENT)
Dept: OBSTETRICS AND GYNECOLOGY | Facility: CLINIC | Age: 27
End: 2022-11-11
Payer: COMMERCIAL

## 2022-11-11 VITALS
DIASTOLIC BLOOD PRESSURE: 60 MMHG | SYSTOLIC BLOOD PRESSURE: 100 MMHG | BODY MASS INDEX: 23.58 KG/M2 | WEIGHT: 124.75 LBS

## 2022-11-11 DIAGNOSIS — Z14.01 HEMOPHILIA CARRIER: ICD-10-CM

## 2022-11-11 DIAGNOSIS — Z3A.18 18 WEEKS GESTATION OF PREGNANCY: ICD-10-CM

## 2022-11-11 DIAGNOSIS — O26.892 PREGNANCY HEADACHE IN SECOND TRIMESTER: ICD-10-CM

## 2022-11-11 DIAGNOSIS — Z34.82 ENCOUNTER FOR SUPERVISION OF OTHER NORMAL PREGNANCY, SECOND TRIMESTER: Primary | ICD-10-CM

## 2022-11-11 DIAGNOSIS — R51.9 PREGNANCY HEADACHE IN SECOND TRIMESTER: ICD-10-CM

## 2022-11-11 PROCEDURE — 99999 PR PBB SHADOW E&M-EST. PATIENT-LVL II: CPT | Mod: PBBFAC,,, | Performed by: OBSTETRICS & GYNECOLOGY

## 2022-11-11 PROCEDURE — 0502F SUBSEQUENT PRENATAL CARE: CPT | Mod: CPTII,S$GLB,, | Performed by: OBSTETRICS & GYNECOLOGY

## 2022-11-11 PROCEDURE — 0502F PR SUBSEQUENT PRENATAL CARE: ICD-10-PCS | Mod: CPTII,S$GLB,, | Performed by: OBSTETRICS & GYNECOLOGY

## 2022-11-11 PROCEDURE — 99999 PR PBB SHADOW E&M-EST. PATIENT-LVL II: ICD-10-PCS | Mod: PBBFAC,,, | Performed by: OBSTETRICS & GYNECOLOGY

## 2022-11-11 RX ORDER — BUTALBITAL, ACETAMINOPHEN AND CAFFEINE 50; 325; 40 MG/1; MG/1; MG/1
1 TABLET ORAL EVERY 4 HOURS PRN
Qty: 20 TABLET | Refills: 0 | Status: SHIPPED | OUTPATIENT
Start: 2022-11-11 | End: 2022-11-14 | Stop reason: SDUPTHER

## 2022-11-11 NOTE — PROGRESS NOTES
18w2d  No complaints. Denies vaginal bleeding, abnormal discharge or pelvic pain. Good fetal movements.  Having headaches on and off not relieved with Tylenol and used Fioricet in past. Rx given for as needed use only.  Anatomy planned 11/25/22.   Hemophilia carrier: Genetics counseling.   RTC 4 weeks

## 2022-11-14 DIAGNOSIS — O26.892 PREGNANCY HEADACHE IN SECOND TRIMESTER: ICD-10-CM

## 2022-11-14 DIAGNOSIS — R51.9 PREGNANCY HEADACHE IN SECOND TRIMESTER: ICD-10-CM

## 2022-11-14 RX ORDER — BUTALBITAL, ACETAMINOPHEN AND CAFFEINE 50; 325; 40 MG/1; MG/1; MG/1
1 TABLET ORAL EVERY 4 HOURS PRN
Qty: 20 TABLET | Refills: 0 | Status: SHIPPED | OUTPATIENT
Start: 2022-11-14 | End: 2022-12-04

## 2022-11-18 ENCOUNTER — PATIENT MESSAGE (OUTPATIENT)
Dept: MATERNAL FETAL MEDICINE | Facility: CLINIC | Age: 27
End: 2022-11-18
Payer: COMMERCIAL

## 2022-11-21 ENCOUNTER — PATIENT MESSAGE (OUTPATIENT)
Dept: OBSTETRICS AND GYNECOLOGY | Facility: CLINIC | Age: 27
End: 2022-11-21
Payer: COMMERCIAL

## 2022-11-22 DIAGNOSIS — F41.9 ANXIETY: Primary | ICD-10-CM

## 2022-11-22 RX ORDER — CITALOPRAM 20 MG/1
20 TABLET, FILM COATED ORAL DAILY
Qty: 30 TABLET | Refills: 3 | Status: SHIPPED | OUTPATIENT
Start: 2022-11-22 | End: 2023-04-04 | Stop reason: SDUPTHER

## 2022-11-25 ENCOUNTER — OFFICE VISIT (OUTPATIENT)
Dept: MATERNAL FETAL MEDICINE | Facility: CLINIC | Age: 27
End: 2022-11-25
Payer: COMMERCIAL

## 2022-11-25 ENCOUNTER — PROCEDURE VISIT (OUTPATIENT)
Dept: MATERNAL FETAL MEDICINE | Facility: CLINIC | Age: 27
End: 2022-11-25
Payer: COMMERCIAL

## 2022-11-25 VITALS
BODY MASS INDEX: 23.58 KG/M2 | WEIGHT: 124.75 LBS | DIASTOLIC BLOOD PRESSURE: 74 MMHG | SYSTOLIC BLOOD PRESSURE: 108 MMHG

## 2022-11-25 DIAGNOSIS — Z34.90 PREGNANCY, UNSPECIFIED GESTATIONAL AGE: ICD-10-CM

## 2022-11-25 DIAGNOSIS — Z36.89 ENCOUNTER FOR ULTRASOUND TO ASSESS FETAL GROWTH: Primary | ICD-10-CM

## 2022-11-25 DIAGNOSIS — Z14.01 HEMOPHILIA A CARRIER, ASYMPTOMATIC: ICD-10-CM

## 2022-11-25 PROBLEM — O30.021: Status: RESOLVED | Noted: 2022-04-07 | Resolved: 2022-11-25

## 2022-11-25 PROCEDURE — 3074F SYST BP LT 130 MM HG: CPT | Mod: CPTII,S$GLB,, | Performed by: OBSTETRICS & GYNECOLOGY

## 2022-11-25 PROCEDURE — 3078F PR MOST RECENT DIASTOLIC BLOOD PRESSURE < 80 MM HG: ICD-10-PCS | Mod: CPTII,S$GLB,, | Performed by: OBSTETRICS & GYNECOLOGY

## 2022-11-25 PROCEDURE — 76811 OB US DETAILED SNGL FETUS: CPT | Mod: S$GLB,,, | Performed by: OBSTETRICS & GYNECOLOGY

## 2022-11-25 PROCEDURE — 1159F PR MEDICATION LIST DOCUMENTED IN MEDICAL RECORD: ICD-10-PCS | Mod: CPTII,S$GLB,, | Performed by: OBSTETRICS & GYNECOLOGY

## 2022-11-25 PROCEDURE — 3078F DIAST BP <80 MM HG: CPT | Mod: CPTII,S$GLB,, | Performed by: OBSTETRICS & GYNECOLOGY

## 2022-11-25 PROCEDURE — 76811 PR US, OB FETAL EVAL & EXAM, TRANSABDOM,FIRST GESTATION: ICD-10-PCS | Mod: S$GLB,,, | Performed by: OBSTETRICS & GYNECOLOGY

## 2022-11-25 PROCEDURE — 99215 PR OFFICE/OUTPT VISIT, EST, LEVL V, 40-54 MIN: ICD-10-PCS | Mod: 25,S$GLB,, | Performed by: OBSTETRICS & GYNECOLOGY

## 2022-11-25 PROCEDURE — 3008F PR BODY MASS INDEX (BMI) DOCUMENTED: ICD-10-PCS | Mod: CPTII,S$GLB,, | Performed by: OBSTETRICS & GYNECOLOGY

## 2022-11-25 PROCEDURE — 3008F BODY MASS INDEX DOCD: CPT | Mod: CPTII,S$GLB,, | Performed by: OBSTETRICS & GYNECOLOGY

## 2022-11-25 PROCEDURE — 3074F PR MOST RECENT SYSTOLIC BLOOD PRESSURE < 130 MM HG: ICD-10-PCS | Mod: CPTII,S$GLB,, | Performed by: OBSTETRICS & GYNECOLOGY

## 2022-11-25 PROCEDURE — 99215 OFFICE O/P EST HI 40 MIN: CPT | Mod: 25,S$GLB,, | Performed by: OBSTETRICS & GYNECOLOGY

## 2022-11-25 PROCEDURE — 99999 PR PBB SHADOW E&M-EST. PATIENT-LVL III: ICD-10-PCS | Mod: PBBFAC,,, | Performed by: OBSTETRICS & GYNECOLOGY

## 2022-11-25 PROCEDURE — 1159F MED LIST DOCD IN RCRD: CPT | Mod: CPTII,S$GLB,, | Performed by: OBSTETRICS & GYNECOLOGY

## 2022-11-25 PROCEDURE — 99999 PR PBB SHADOW E&M-EST. PATIENT-LVL III: CPT | Mod: PBBFAC,,, | Performed by: OBSTETRICS & GYNECOLOGY

## 2022-11-25 NOTE — ASSESSMENT & PLAN NOTE
The patient is referred to discuss peripartum recommendations given her hemophilia carrier status.  She is aware of the method of transmission in the fact that this male fetus has a 50% chance of being a hemophiliac. Please see Ms. Marisabel Pedro's (genetic counselor) note for full details.  She had previously established care with [facility], however she has not seen them in years. She has not had a recent Factor VIII level drawn. She reports having no symptoms and denies any history of heavy bleeding or easy bruising/bleeding.   We had a discussion about the optimal method of delivery (vaginal versus ), however this remains a subject of debate, although the majority of neonates with hemophilia can be delivered safely with either method. There are no data from randomized trials to allow comparison of risks.  The data would suggest that vaginal delivery is safe in the  with severe hemophilia as the risk is approximately 2% and the vast majority of that risk attributable to operative vaginal delivery.  Of course, an elective  would likely avoid this risk.  Any attempts at fetal scalp electrode placement or operative vaginal delivery should be avoided.  If vaginal delivery is chosen, any abnormalities or delay in labor progression should be considered a reason for conversion to a  delivery.  The risk of subdural or cerebral hemorrhage with spontaneous vaginal delivery, vacuum, or forceps delivery was 2.9, 8.0, or 9.8 per 10,000, respectively in a large retrospective review. With vacuum plus forceps, the risk was 21.3 per 10,000. This compared with a risk of 4.1 per 10,000 who underwent planned  delivery; rates with  delivery following attempted labor were higher.    The need for factor replacement for the mother and use of neuraxial anesthesia should be addressed prior to delivery based on maternal factor levels.  Patient has an appointment with the [facility]  Hemophilia center on 1/6. We would anticipate factor levels to be drawn at this time. We will reach out at next visit for any further recommendations around delivery.     At birth, diagnosis on infant can be made using cord blood sample. She has previously been counseled on Amniocentesis and declined.    Recommendations:   Growth at 32 weeks. Follow up MD visit that day as well.   Genetic counseling previously performed.  o Cord Blood collection by OB at time of delivery  o Will readdress exact lab to be sent at later gestational age.   OB Anesthesia consult in early third trimester  o (Should be ordered and arranged by the patient's primary OB provider).   Mode of delivery still being discussed per above.  o Will readdress at next visit at 32 weeks.   If vaginal delivery attempted  o Avoid Operative vaginal delivery, if possible  o Avoid use of fetal scalp electrode   Increased vigilance for risk of postpartum hemorrhage  o Active management of the third stage of labor  o Have all uterotonics readily available  o Consider use of TXA

## 2022-11-25 NOTE — PROGRESS NOTES
MATERNAL-FETAL MEDICINE   CONSULT NOTE    Provider requesting consultation: Red    SUBJECTIVE:     Ms. Leticia Mcfadden is a 27 y.o.  female with IUP at 19w4d who is seen in consultation by MFM for evaluation and management of:  Problem   Hemophilia A Carrier, Asymptomatic     Patient has no complaints today. She is overall feeling well.  Patient denies any contractions/cramping, vaginal bleeding or leakage of fluid.       Medication List with Changes/Refills   Current Medications    BUTALBITAL-ACETAMINOPHEN-CAFFEINE -40 MG (FIORICET, ESGIC) -40 MG PER TABLET    Take 1 tablet by mouth every 4 (four) hours as needed for Headaches.    CITALOPRAM (CELEXA) 20 MG TABLET    Take 1 tablet (20 mg total) by mouth once daily.    PRENATAL 25/IRON FUM/FOLIC/DHA (PRENATAL-1 ORAL)    Take by mouth.       Review of patient's allergies indicates:  No Known Allergies    PMH:  Past Medical History:   Diagnosis Date    Anxiety     Hemophilia carrier      PObHx:  OB History    Para Term  AB Living   3       1 0   SAB IAB Ectopic Multiple Live Births   1              # Outcome Date GA Lbr [place]/2nd Weight Sex Delivery Anes PTL Lv   3 Current            2 SAB 22 6w0d          1               PSH:  Past Surgical History:   Procedure Laterality Date    AUGMENTATION OF BREAST      DILATION AND CURETTAGE OF UTERUS      WISDOM TOOTH EXTRACTION         Family history:family history includes Diabetes in her father.    Social history: reports that she has never smoked. She has never used smokeless tobacco. She reports that she does not drink alcohol and does not use drugs.    Genetic history: please see Ms. Pedro note for full details    Objective:   /74 (BP Location: Left arm, Patient Position: Sitting)   Wt 56.6 kg (124 lb 12.5 oz)   LMP 2022   BMI 23.58 kg/m²     Physical Exam  Deferred    Ultrasound performed. See viewpoint for full ultrasound report.  A detailed fetal anatomic  ultrasound examination was performed today due to the following high risk indication: Prior conjoined twin and Hemophilia carrier.  No fetal structural abnormalities are identified today.  Fetal size is appropriate for established gestational age ( g).   Transabdominal cervical length is normal.   Placental location is anterior without evidence of previa.     Significant labs/imaging:  MatT21 - XY low risk    ASSESSMENT/PLAN:     27 y.o.  female with IUP at 19w4d     Hemophilia A carrier, asymptomatic  The patient is referred to discuss peripartum recommendations given her hemophilia carrier status.  She is aware of the method of transmission in the fact that this male fetus has a 50% chance of being a hemophiliac. Please see Ms. Marisabel Pedro's (genetic counselor) note for full details.  She had previously established care with [facility] hemophilia clinic, however she has not seen them in years. She has not had a recent Factor VIII level drawn. She reports having no symptoms and denies any history of heavy bleeding or easy bruising/bleeding.   We had a discussion about the optimal method of delivery (vaginal versus ), however this remains a subject of debate, although the majority of neonates with hemophilia can be delivered safely with either method. There are no data from randomized trials to allow comparison of risks.  The data would suggest that vaginal delivery is safe in the  with severe hemophilia as the risk is approximately 2% and the vast majority of that risk attributable to operative vaginal delivery.  Of course, an elective  would likely avoid this risk.  Any attempts at fetal scalp electrode placement or operative vaginal delivery should be avoided.  If vaginal delivery is chosen, any abnormalities or delay in labor progression should be considered a reason for conversion to a  delivery.  The risk of subdural or cerebral hemorrhage with spontaneous vaginal  delivery, vacuum, or forceps delivery was 2.9, 8.0, or 9.8 per 10,000, respectively in a large retrospective review. With vacuum plus forceps, the risk was 21.3 per 10,000. This compared with a risk of 4.1 per 10,000 who underwent planned  delivery; rates with  delivery following attempted labor were higher.    The need for factor replacement for the mother and use of neuraxial anesthesia should be addressed prior to delivery based on maternal factor levels.  Patient has an appointment with the [facility] Hemophilia center on . We would anticipate factor levels to be drawn at this time. We will reach out at next visit for any further recommendations around delivery.     At birth, diagnosis on infant can be made using cord blood sample. She has previously been counseled on Amniocentesis and declined.    Recommendations:  Growth at 32 weeks. Follow up MD visit that day as well.  Genetic counseling previously performed.  Cord Blood collection by OB at time of delivery  Will readdress exact lab to be sent at later gestational age.  OB Anesthesia consult in early third trimester  (Should be ordered and arranged by the patient's primary OB provider).  Mode of delivery still being discussed per above.  Will readdress at next visit at 32 weeks.  If vaginal delivery attempted  Avoid Operative vaginal delivery, if possible  Avoid use of fetal scalp electrode  Increased vigilance for risk of postpartum hemorrhage  Active management of the third stage of labor  Have all uterotonics readily available  Consider use of TXA      Patient was counseled that prenatal ultrasound studies have limitations. They do not detect all fetal, genetic, placental, and maternal abnormalities. A normal appearing prenatal ultrasound is reassuring. However, it does not guarantee the absence of an abnormality or predict a normal outcome for the fetus or the mother.     FOLLOW UP:   A follow up ultrasound will be made for 32 weeks'  gestation. A follow up MFM MD visit will be made for same day.       The patient was given an opportunity to ask questions about the management of her high risk pregnancy problems. She expressed an understanding of and agreement to the above impression and plan. All questions were answered to her satisfaction.    50 minutes of total time spent on the encounter, which includes face to face time and non-face to face time preparing to see the patient (eg, review of tests), obtaining and/or reviewing separately obtained history, documenting clinical information in the electronic or other health record, independently interpreting results (not separately reported) and communicating results to the patient/family/caregiver, or care coordination (not separately reported).        Tra Russ MD   Maternal-Fetal Medicine      Electronically Signed by Tra Russ November 25, 2022

## 2022-12-06 ENCOUNTER — ROUTINE PRENATAL (OUTPATIENT)
Dept: OBSTETRICS AND GYNECOLOGY | Facility: CLINIC | Age: 27
End: 2022-12-06
Payer: COMMERCIAL

## 2022-12-06 VITALS
BODY MASS INDEX: 23.74 KG/M2 | SYSTOLIC BLOOD PRESSURE: 110 MMHG | WEIGHT: 125.69 LBS | DIASTOLIC BLOOD PRESSURE: 64 MMHG

## 2022-12-06 DIAGNOSIS — Z3A.21 21 WEEKS GESTATION OF PREGNANCY: ICD-10-CM

## 2022-12-06 DIAGNOSIS — Z34.82 ENCOUNTER FOR SUPERVISION OF OTHER NORMAL PREGNANCY, SECOND TRIMESTER: Primary | ICD-10-CM

## 2022-12-06 PROCEDURE — 0502F PR SUBSEQUENT PRENATAL CARE: ICD-10-PCS | Mod: CPTII,S$GLB,, | Performed by: OBSTETRICS & GYNECOLOGY

## 2022-12-06 PROCEDURE — 99999 PR PBB SHADOW E&M-EST. PATIENT-LVL III: ICD-10-PCS | Mod: PBBFAC,,, | Performed by: OBSTETRICS & GYNECOLOGY

## 2022-12-06 PROCEDURE — 0502F SUBSEQUENT PRENATAL CARE: CPT | Mod: CPTII,S$GLB,, | Performed by: OBSTETRICS & GYNECOLOGY

## 2022-12-06 PROCEDURE — 99999 PR PBB SHADOW E&M-EST. PATIENT-LVL III: CPT | Mod: PBBFAC,,, | Performed by: OBSTETRICS & GYNECOLOGY

## 2022-12-06 NOTE — PROGRESS NOTES
21w1d  No complaints. Denies vaginal bleeding, abnormal discharge or pelvic pain. Good fetal movements.  Reviewed anatomy ultrasound and normal.  Discussed glucose test/labs and planned with next visit.  RTC 4 weeks

## 2023-01-02 ENCOUNTER — PATIENT MESSAGE (OUTPATIENT)
Dept: ADMINISTRATIVE | Facility: OTHER | Age: 28
End: 2023-01-02
Payer: COMMERCIAL

## 2023-01-04 ENCOUNTER — LAB VISIT (OUTPATIENT)
Dept: LAB | Facility: HOSPITAL | Age: 28
End: 2023-01-04
Attending: OBSTETRICS & GYNECOLOGY
Payer: COMMERCIAL

## 2023-01-04 ENCOUNTER — ROUTINE PRENATAL (OUTPATIENT)
Dept: OBSTETRICS AND GYNECOLOGY | Facility: CLINIC | Age: 28
End: 2023-01-04
Payer: COMMERCIAL

## 2023-01-04 VITALS
WEIGHT: 132.25 LBS | BODY MASS INDEX: 24.99 KG/M2 | DIASTOLIC BLOOD PRESSURE: 64 MMHG | SYSTOLIC BLOOD PRESSURE: 110 MMHG

## 2023-01-04 DIAGNOSIS — Z14.01 HEMOPHILIA CARRIER: ICD-10-CM

## 2023-01-04 DIAGNOSIS — Z34.82 ENCOUNTER FOR SUPERVISION OF OTHER NORMAL PREGNANCY, SECOND TRIMESTER: Primary | ICD-10-CM

## 2023-01-04 DIAGNOSIS — Z3A.25 25 WEEKS GESTATION OF PREGNANCY: ICD-10-CM

## 2023-01-04 DIAGNOSIS — Z34.82 ENCOUNTER FOR SUPERVISION OF OTHER NORMAL PREGNANCY, SECOND TRIMESTER: ICD-10-CM

## 2023-01-04 LAB
BASOPHILS # BLD AUTO: 0.02 K/UL (ref 0–0.2)
BASOPHILS NFR BLD: 0.2 % (ref 0–1.9)
BLD GP AB SCN CELLS X3 SERPL QL: NORMAL
DIFFERENTIAL METHOD: ABNORMAL
EOSINOPHIL # BLD AUTO: 0 K/UL (ref 0–0.5)
EOSINOPHIL NFR BLD: 0.2 % (ref 0–8)
ERYTHROCYTE [DISTWIDTH] IN BLOOD BY AUTOMATED COUNT: 13.5 % (ref 11.5–14.5)
GLUCOSE SERPL-MCNC: 93 MG/DL (ref 70–140)
HCT VFR BLD AUTO: 32.5 % (ref 37–48.5)
HGB BLD-MCNC: 10.8 G/DL (ref 12–16)
IMM GRANULOCYTES # BLD AUTO: 0.04 K/UL (ref 0–0.04)
IMM GRANULOCYTES NFR BLD AUTO: 0.5 % (ref 0–0.5)
LYMPHOCYTES # BLD AUTO: 1.3 K/UL (ref 1–4.8)
LYMPHOCYTES NFR BLD: 15.3 % (ref 18–48)
MCH RBC QN AUTO: 32.1 PG (ref 27–31)
MCHC RBC AUTO-ENTMCNC: 33.2 G/DL (ref 32–36)
MCV RBC AUTO: 97 FL (ref 82–98)
MONOCYTES # BLD AUTO: 0.5 K/UL (ref 0.3–1)
MONOCYTES NFR BLD: 5.3 % (ref 4–15)
NEUTROPHILS # BLD AUTO: 6.9 K/UL (ref 1.8–7.7)
NEUTROPHILS NFR BLD: 78.5 % (ref 38–73)
NRBC BLD-RTO: 0 /100 WBC
PLATELET # BLD AUTO: 205 K/UL (ref 150–450)
PMV BLD AUTO: 10.3 FL (ref 9.2–12.9)
RBC # BLD AUTO: 3.36 M/UL (ref 4–5.4)
WBC # BLD AUTO: 8.74 K/UL (ref 3.9–12.7)

## 2023-01-04 PROCEDURE — 99999 PR PBB SHADOW E&M-EST. PATIENT-LVL III: ICD-10-PCS | Mod: PBBFAC,,, | Performed by: OBSTETRICS & GYNECOLOGY

## 2023-01-04 PROCEDURE — 86850 RBC ANTIBODY SCREEN: CPT | Performed by: OBSTETRICS & GYNECOLOGY

## 2023-01-04 PROCEDURE — 85025 COMPLETE CBC W/AUTO DIFF WBC: CPT | Performed by: OBSTETRICS & GYNECOLOGY

## 2023-01-04 PROCEDURE — 82950 GLUCOSE TEST: CPT | Performed by: OBSTETRICS & GYNECOLOGY

## 2023-01-04 PROCEDURE — 99999 PR PBB SHADOW E&M-EST. PATIENT-LVL III: CPT | Mod: PBBFAC,,, | Performed by: OBSTETRICS & GYNECOLOGY

## 2023-01-04 PROCEDURE — 85240 CLOT FACTOR VIII AHG 1 STAGE: CPT | Performed by: OBSTETRICS & GYNECOLOGY

## 2023-01-04 PROCEDURE — 0502F PR SUBSEQUENT PRENATAL CARE: ICD-10-PCS | Mod: CPTII,S$GLB,, | Performed by: OBSTETRICS & GYNECOLOGY

## 2023-01-04 PROCEDURE — 0502F SUBSEQUENT PRENATAL CARE: CPT | Mod: CPTII,S$GLB,, | Performed by: OBSTETRICS & GYNECOLOGY

## 2023-01-04 NOTE — PROGRESS NOTES
25w2d  No complaints. Denies vaginal bleeding, abnormal discharge or pelvic pain. Good fetal movements.  Glucose test/labs done today.  Rx for breast pump given.  Hemophilia carrier: Factor 8 ordered per MFM/Hematology recs and will see them soon.  RTC 4 weeks

## 2023-01-06 LAB — FACT VIII ACT/NOR PPP: 50 % (ref 60–170)

## 2023-01-09 ENCOUNTER — PATIENT MESSAGE (OUTPATIENT)
Dept: OTHER | Facility: OTHER | Age: 28
End: 2023-01-09
Payer: COMMERCIAL

## 2023-01-23 ENCOUNTER — PATIENT MESSAGE (OUTPATIENT)
Dept: OTHER | Facility: OTHER | Age: 28
End: 2023-01-23
Payer: COMMERCIAL

## 2023-01-24 ENCOUNTER — CLINICAL SUPPORT (OUTPATIENT)
Dept: OBSTETRICS AND GYNECOLOGY | Facility: CLINIC | Age: 28
End: 2023-01-24
Payer: COMMERCIAL

## 2023-01-24 ENCOUNTER — ROUTINE PRENATAL (OUTPATIENT)
Dept: OBSTETRICS AND GYNECOLOGY | Facility: CLINIC | Age: 28
End: 2023-01-24
Payer: COMMERCIAL

## 2023-01-24 VITALS
WEIGHT: 131.75 LBS | DIASTOLIC BLOOD PRESSURE: 78 MMHG | BODY MASS INDEX: 24.89 KG/M2 | SYSTOLIC BLOOD PRESSURE: 112 MMHG

## 2023-01-24 DIAGNOSIS — Z29.13 NEED FOR RHOGAM DUE TO RH NEGATIVE MOTHER: ICD-10-CM

## 2023-01-24 DIAGNOSIS — Z29.13 NEED FOR RHOGAM DUE TO RH NEGATIVE MOTHER: Primary | ICD-10-CM

## 2023-01-24 DIAGNOSIS — Z3A.28 28 WEEKS GESTATION OF PREGNANCY: ICD-10-CM

## 2023-01-24 DIAGNOSIS — Z23 NEED FOR DIPHTHERIA-TETANUS-PERTUSSIS (TDAP) VACCINE: ICD-10-CM

## 2023-01-24 DIAGNOSIS — Z14.01 HEMOPHILIA CARRIER: ICD-10-CM

## 2023-01-24 DIAGNOSIS — Z34.83 ENCOUNTER FOR SUPERVISION OF OTHER NORMAL PREGNANCY, THIRD TRIMESTER: Primary | ICD-10-CM

## 2023-01-24 PROCEDURE — 99999 PR PBB SHADOW E&M-EST. PATIENT-LVL III: CPT | Mod: PBBFAC,,, | Performed by: OBSTETRICS & GYNECOLOGY

## 2023-01-24 PROCEDURE — 96372 RHO (D) IMMUNE GLOBULIN: ICD-10-PCS | Mod: S$GLB,,, | Performed by: OBSTETRICS & GYNECOLOGY

## 2023-01-24 PROCEDURE — 90715 TDAP VACCINE 7 YRS/> IM: CPT | Mod: S$GLB,,, | Performed by: OBSTETRICS & GYNECOLOGY

## 2023-01-24 PROCEDURE — 96372 THER/PROPH/DIAG INJ SC/IM: CPT | Mod: S$GLB,,, | Performed by: OBSTETRICS & GYNECOLOGY

## 2023-01-24 PROCEDURE — 99999 PR PBB SHADOW E&M-EST. PATIENT-LVL III: ICD-10-PCS | Mod: PBBFAC,,, | Performed by: OBSTETRICS & GYNECOLOGY

## 2023-01-24 PROCEDURE — 90715 TDAP VACCINE GREATER THAN OR EQUAL TO 7YO IM: ICD-10-PCS | Mod: S$GLB,,, | Performed by: OBSTETRICS & GYNECOLOGY

## 2023-01-24 PROCEDURE — 0502F SUBSEQUENT PRENATAL CARE: CPT | Mod: CPTII,S$GLB,, | Performed by: OBSTETRICS & GYNECOLOGY

## 2023-01-24 PROCEDURE — 0502F PR SUBSEQUENT PRENATAL CARE: ICD-10-PCS | Mod: CPTII,S$GLB,, | Performed by: OBSTETRICS & GYNECOLOGY

## 2023-01-24 PROCEDURE — 99999 PR PBB SHADOW E&M-EST. PATIENT-LVL I: CPT | Mod: PBBFAC,,,

## 2023-01-24 PROCEDURE — 99999 PR PBB SHADOW E&M-EST. PATIENT-LVL I: ICD-10-PCS | Mod: PBBFAC,,,

## 2023-01-24 PROCEDURE — 90471 TDAP VACCINE GREATER THAN OR EQUAL TO 7YO IM: ICD-10-PCS | Mod: S$GLB,,, | Performed by: OBSTETRICS & GYNECOLOGY

## 2023-01-24 PROCEDURE — 90471 IMMUNIZATION ADMIN: CPT | Mod: S$GLB,,, | Performed by: OBSTETRICS & GYNECOLOGY

## 2023-01-24 NOTE — PROGRESS NOTES
.1/24/23 Pt provided Tdap VIS,  Pt administered Tdap to the left   Deltoid. Pt tolerated well. Pt instructed to wait 15 minutes in the waiting room following injection in case of reaction. Pt verbalizes understanding.     Pt administered Rhogam 300 mcg IM to the  left upper outer glut. Pt tolerated well and provided proof of injection card.       Ordering Provider:Dr Moon    Pain before: None    Pain after: None     Patient complaints:none

## 2023-01-24 NOTE — PROGRESS NOTES
28w1d  No complaints. Denies vaginal bleeding, abnormal discharge or pelvic pain. Good fetal movements. Counseled on fetal movement counts. No contractions.  Hemophilia carrier: Seen by [place] [facility] and reviewed Factor 8 with no recommendations other than to repeat which they will do in a few weeks.  Counseled on Rhogam and Tdap and both given today.  Reviewed labs and normal.  RTC 2 weeks

## 2023-02-06 ENCOUNTER — PATIENT MESSAGE (OUTPATIENT)
Dept: OTHER | Facility: OTHER | Age: 28
End: 2023-02-06
Payer: COMMERCIAL

## 2023-02-08 ENCOUNTER — ROUTINE PRENATAL (OUTPATIENT)
Dept: OBSTETRICS AND GYNECOLOGY | Facility: CLINIC | Age: 28
End: 2023-02-08
Payer: COMMERCIAL

## 2023-02-08 VITALS
DIASTOLIC BLOOD PRESSURE: 60 MMHG | WEIGHT: 136.56 LBS | SYSTOLIC BLOOD PRESSURE: 110 MMHG | BODY MASS INDEX: 25.81 KG/M2

## 2023-02-08 DIAGNOSIS — Z14.01 HEMOPHILIA CARRIER: ICD-10-CM

## 2023-02-08 DIAGNOSIS — Z34.83 ENCOUNTER FOR SUPERVISION OF OTHER NORMAL PREGNANCY, THIRD TRIMESTER: Primary | ICD-10-CM

## 2023-02-08 DIAGNOSIS — B37.2 SKIN YEAST INFECTION: ICD-10-CM

## 2023-02-08 DIAGNOSIS — Z3A.30 30 WEEKS GESTATION OF PREGNANCY: ICD-10-CM

## 2023-02-08 PROCEDURE — 99999 PR PBB SHADOW E&M-EST. PATIENT-LVL III: ICD-10-PCS | Mod: PBBFAC,,, | Performed by: OBSTETRICS & GYNECOLOGY

## 2023-02-08 PROCEDURE — 99999 PR PBB SHADOW E&M-EST. PATIENT-LVL III: CPT | Mod: PBBFAC,,, | Performed by: OBSTETRICS & GYNECOLOGY

## 2023-02-08 PROCEDURE — 0502F SUBSEQUENT PRENATAL CARE: CPT | Mod: CPTII,S$GLB,, | Performed by: OBSTETRICS & GYNECOLOGY

## 2023-02-08 PROCEDURE — 0502F PR SUBSEQUENT PRENATAL CARE: ICD-10-PCS | Mod: CPTII,S$GLB,, | Performed by: OBSTETRICS & GYNECOLOGY

## 2023-02-08 RX ORDER — NYSTATIN 100000 [USP'U]/G
POWDER TOPICAL
Qty: 60 G | Refills: 0 | Status: SHIPPED | OUTPATIENT
Start: 2023-02-08 | End: 2024-02-08

## 2023-02-08 NOTE — PROGRESS NOTES
30w2d  No complaints. Denies vaginal bleeding, abnormal discharge or pelvic pain. Good fetal movements with kick counts met daily.  No contractions.  Getting yeast between breasts from sweating at night. Rx nystatin powder sent to pharmacy.  Hemophilia carrier: Seen by [facility] and reviewed Factor 8 with no recommendations other than to repeat which they will do in a few weeks.  Mood stable on Celexa.   labor precautions.  RTC 2 weeks

## 2023-02-20 ENCOUNTER — PATIENT MESSAGE (OUTPATIENT)
Dept: OTHER | Facility: OTHER | Age: 28
End: 2023-02-20
Payer: COMMERCIAL

## 2023-02-23 ENCOUNTER — PATIENT MESSAGE (OUTPATIENT)
Dept: MATERNAL FETAL MEDICINE | Facility: CLINIC | Age: 28
End: 2023-02-23
Payer: COMMERCIAL

## 2023-02-24 ENCOUNTER — OFFICE VISIT (OUTPATIENT)
Dept: MATERNAL FETAL MEDICINE | Facility: CLINIC | Age: 28
End: 2023-02-24
Attending: OBSTETRICS & GYNECOLOGY
Payer: COMMERCIAL

## 2023-02-24 ENCOUNTER — PROCEDURE VISIT (OUTPATIENT)
Dept: MATERNAL FETAL MEDICINE | Facility: CLINIC | Age: 28
End: 2023-02-24
Payer: COMMERCIAL

## 2023-02-24 VITALS
HEIGHT: 61 IN | BODY MASS INDEX: 26.19 KG/M2 | DIASTOLIC BLOOD PRESSURE: 66 MMHG | WEIGHT: 138.69 LBS | SYSTOLIC BLOOD PRESSURE: 111 MMHG

## 2023-02-24 DIAGNOSIS — Z36.89 ENCOUNTER FOR ULTRASOUND TO ASSESS FETAL GROWTH: ICD-10-CM

## 2023-02-24 DIAGNOSIS — Z14.01 HEMOPHILIA A CARRIER, ASYMPTOMATIC: ICD-10-CM

## 2023-02-24 DIAGNOSIS — Z36.89 ENCOUNTER FOR ULTRASOUND TO CHECK FETAL GROWTH: Primary | ICD-10-CM

## 2023-02-24 DIAGNOSIS — Z14.01 HEMOPHILIA A CARRIER, ASYMPTOMATIC: Primary | ICD-10-CM

## 2023-02-24 PROCEDURE — 99214 PR OFFICE/OUTPT VISIT, EST, LEVL IV, 30-39 MIN: ICD-10-PCS | Mod: 25,S$GLB,, | Performed by: OBSTETRICS & GYNECOLOGY

## 2023-02-24 PROCEDURE — 1159F PR MEDICATION LIST DOCUMENTED IN MEDICAL RECORD: ICD-10-PCS | Mod: CPTII,S$GLB,, | Performed by: OBSTETRICS & GYNECOLOGY

## 2023-02-24 PROCEDURE — 3078F DIAST BP <80 MM HG: CPT | Mod: CPTII,S$GLB,, | Performed by: OBSTETRICS & GYNECOLOGY

## 2023-02-24 PROCEDURE — 76816 PR  US,PREGNANT UTERUS,F/U,TRANSABD APP: ICD-10-PCS | Mod: S$GLB,,, | Performed by: OBSTETRICS & GYNECOLOGY

## 2023-02-24 PROCEDURE — 3008F PR BODY MASS INDEX (BMI) DOCUMENTED: ICD-10-PCS | Mod: CPTII,S$GLB,, | Performed by: OBSTETRICS & GYNECOLOGY

## 2023-02-24 PROCEDURE — 3078F PR MOST RECENT DIASTOLIC BLOOD PRESSURE < 80 MM HG: ICD-10-PCS | Mod: CPTII,S$GLB,, | Performed by: OBSTETRICS & GYNECOLOGY

## 2023-02-24 PROCEDURE — 3074F SYST BP LT 130 MM HG: CPT | Mod: CPTII,S$GLB,, | Performed by: OBSTETRICS & GYNECOLOGY

## 2023-02-24 PROCEDURE — 99214 OFFICE O/P EST MOD 30 MIN: CPT | Mod: 25,S$GLB,, | Performed by: OBSTETRICS & GYNECOLOGY

## 2023-02-24 PROCEDURE — 99999 PR PBB SHADOW E&M-EST. PATIENT-LVL III: CPT | Mod: PBBFAC,,, | Performed by: OBSTETRICS & GYNECOLOGY

## 2023-02-24 PROCEDURE — 3074F PR MOST RECENT SYSTOLIC BLOOD PRESSURE < 130 MM HG: ICD-10-PCS | Mod: CPTII,S$GLB,, | Performed by: OBSTETRICS & GYNECOLOGY

## 2023-02-24 PROCEDURE — 76816 OB US FOLLOW-UP PER FETUS: CPT | Mod: S$GLB,,, | Performed by: OBSTETRICS & GYNECOLOGY

## 2023-02-24 PROCEDURE — 99999 PR PBB SHADOW E&M-EST. PATIENT-LVL III: ICD-10-PCS | Mod: PBBFAC,,, | Performed by: OBSTETRICS & GYNECOLOGY

## 2023-02-24 PROCEDURE — 1159F MED LIST DOCD IN RCRD: CPT | Mod: CPTII,S$GLB,, | Performed by: OBSTETRICS & GYNECOLOGY

## 2023-02-24 PROCEDURE — 1160F RVW MEDS BY RX/DR IN RCRD: CPT | Mod: CPTII,S$GLB,, | Performed by: OBSTETRICS & GYNECOLOGY

## 2023-02-24 PROCEDURE — 3008F BODY MASS INDEX DOCD: CPT | Mod: CPTII,S$GLB,, | Performed by: OBSTETRICS & GYNECOLOGY

## 2023-02-24 PROCEDURE — 1160F PR REVIEW ALL MEDS BY PRESCRIBER/CLIN PHARMACIST DOCUMENTED: ICD-10-PCS | Mod: CPTII,S$GLB,, | Performed by: OBSTETRICS & GYNECOLOGY

## 2023-02-24 NOTE — PROGRESS NOTES
"Maternal Fetal Medicine follow up consult    SUBJECTIVE:     Leticia Mcfadden is a 27 y.o.  female with IUP at 32w4d who is seen in follow up consultation by [facility].  Pregnancy complications include:   No problems updated.    Previous notes reviewed.   No changes to medical, surgical, family, social, or obstetric history.    Interval history since last [facility] visit: Early January Factor VIII level 50 (nl>60)  No other complaints    Medications:  Current Outpatient Medications   Medication Instructions    citalopram (CELEXA) 20 mg, Oral, Daily    nystatin (MYCOSTATIN) powder Apply to affected area 3 times daily    prenatal 25/iron fum/folic/dha (PRENATAL-1 ORAL) Oral       Care team members:  Dr. Sutton - Primary OB  [facility] for Hemophilia A     OBJECTIVE:   /66 (BP Location: Left arm, Patient Position: Sitting)   Ht 5' 1" (1.549 m)   Wt 62.9 kg (138 lb 10.7 oz)   LMP 2022   BMI 26.20 kg/m²         Ultrasound performed. See viewpoint for full ultrasound report.  Good fetal growth    Significant labs/imaging:  As above     ASSESSMENT/PLAN:     27 y.o.  female with IUP at 32w4d  Factor VIII level still low.  Will reassess in 3 weeks- Dr. Sutton to redraw.  We will se her back in 1 month and reassess    The patient was given an opportunity to ask questions about management and the diease process.  She expressed an understanding of and agreement to the above impression and plan. All questions were answered to her satisfaction.  She was given contact information to the [facility] clinic to address further concerns.      "

## 2023-03-13 ENCOUNTER — PATIENT MESSAGE (OUTPATIENT)
Dept: OTHER | Facility: OTHER | Age: 28
End: 2023-03-13
Payer: COMMERCIAL

## 2023-03-14 ENCOUNTER — ROUTINE PRENATAL (OUTPATIENT)
Dept: OBSTETRICS AND GYNECOLOGY | Facility: CLINIC | Age: 28
End: 2023-03-14
Payer: COMMERCIAL

## 2023-03-14 ENCOUNTER — LAB VISIT (OUTPATIENT)
Dept: LAB | Facility: HOSPITAL | Age: 28
End: 2023-03-14
Attending: OBSTETRICS & GYNECOLOGY
Payer: COMMERCIAL

## 2023-03-14 VITALS
DIASTOLIC BLOOD PRESSURE: 68 MMHG | BODY MASS INDEX: 26.85 KG/M2 | SYSTOLIC BLOOD PRESSURE: 108 MMHG | WEIGHT: 142.06 LBS

## 2023-03-14 DIAGNOSIS — Z14.01 HEMOPHILIA CARRIER: ICD-10-CM

## 2023-03-14 DIAGNOSIS — Z34.83 ENCOUNTER FOR SUPERVISION OF OTHER NORMAL PREGNANCY, THIRD TRIMESTER: ICD-10-CM

## 2023-03-14 DIAGNOSIS — Z3A.35 35 WEEKS GESTATION OF PREGNANCY: ICD-10-CM

## 2023-03-14 DIAGNOSIS — Z34.83 ENCOUNTER FOR SUPERVISION OF OTHER NORMAL PREGNANCY, THIRD TRIMESTER: Primary | ICD-10-CM

## 2023-03-14 LAB
BASOPHILS # BLD AUTO: 0.04 K/UL (ref 0–0.2)
BASOPHILS NFR BLD: 0.4 % (ref 0–1.9)
DIFFERENTIAL METHOD: ABNORMAL
EOSINOPHIL # BLD AUTO: 0 K/UL (ref 0–0.5)
EOSINOPHIL NFR BLD: 0.4 % (ref 0–8)
ERYTHROCYTE [DISTWIDTH] IN BLOOD BY AUTOMATED COUNT: 13.4 % (ref 11.5–14.5)
FERRITIN SERPL-MCNC: 16 NG/ML (ref 20–300)
HCT VFR BLD AUTO: 35.7 % (ref 37–48.5)
HGB BLD-MCNC: 11.3 G/DL (ref 12–16)
HIV 1+2 AB+HIV1 P24 AG SERPL QL IA: NORMAL
IMM GRANULOCYTES # BLD AUTO: 0.06 K/UL (ref 0–0.04)
IMM GRANULOCYTES NFR BLD AUTO: 0.6 % (ref 0–0.5)
LYMPHOCYTES # BLD AUTO: 1.5 K/UL (ref 1–4.8)
LYMPHOCYTES NFR BLD: 14.9 % (ref 18–48)
MCH RBC QN AUTO: 30.5 PG (ref 27–31)
MCHC RBC AUTO-ENTMCNC: 31.7 G/DL (ref 32–36)
MCV RBC AUTO: 97 FL (ref 82–98)
MONOCYTES # BLD AUTO: 0.5 K/UL (ref 0.3–1)
MONOCYTES NFR BLD: 4.7 % (ref 4–15)
NEUTROPHILS # BLD AUTO: 8.1 K/UL (ref 1.8–7.7)
NEUTROPHILS NFR BLD: 79 % (ref 38–73)
NRBC BLD-RTO: 0 /100 WBC
PLATELET # BLD AUTO: 195 K/UL (ref 150–450)
PMV BLD AUTO: 10.4 FL (ref 9.2–12.9)
RBC # BLD AUTO: 3.7 M/UL (ref 4–5.4)
WBC # BLD AUTO: 10.19 K/UL (ref 3.9–12.7)

## 2023-03-14 PROCEDURE — 82728 ASSAY OF FERRITIN: CPT | Performed by: OBSTETRICS & GYNECOLOGY

## 2023-03-14 PROCEDURE — 85025 COMPLETE CBC W/AUTO DIFF WBC: CPT | Performed by: OBSTETRICS & GYNECOLOGY

## 2023-03-14 PROCEDURE — 0502F PR SUBSEQUENT PRENATAL CARE: ICD-10-PCS | Mod: CPTII,S$GLB,, | Performed by: OBSTETRICS & GYNECOLOGY

## 2023-03-14 PROCEDURE — 87389 HIV-1 AG W/HIV-1&-2 AB AG IA: CPT | Performed by: OBSTETRICS & GYNECOLOGY

## 2023-03-14 PROCEDURE — 99999 PR PBB SHADOW E&M-EST. PATIENT-LVL III: ICD-10-PCS | Mod: PBBFAC,,, | Performed by: OBSTETRICS & GYNECOLOGY

## 2023-03-14 PROCEDURE — 0502F SUBSEQUENT PRENATAL CARE: CPT | Mod: CPTII,S$GLB,, | Performed by: OBSTETRICS & GYNECOLOGY

## 2023-03-14 PROCEDURE — 86592 SYPHILIS TEST NON-TREP QUAL: CPT | Performed by: OBSTETRICS & GYNECOLOGY

## 2023-03-14 PROCEDURE — 99999 PR PBB SHADOW E&M-EST. PATIENT-LVL III: CPT | Mod: PBBFAC,,, | Performed by: OBSTETRICS & GYNECOLOGY

## 2023-03-14 PROCEDURE — 85240 CLOT FACTOR VIII AHG 1 STAGE: CPT | Performed by: OBSTETRICS & GYNECOLOGY

## 2023-03-14 NOTE — PROGRESS NOTES
1+ glucose in urine.   
35w1d  No complaints. Denies vaginal bleeding, abnormal discharge or pelvic pain. Good fetal movements with kick counts met daily.  No contractions.  Hemophilia carrier: Repeat Factor 8 level with HIV/RPR/CBC today.  Plan GBS/consents with next visit  Mood stable on Celexa.  RTC 1-2 weeks  
Breath sounds clear and equal bilaterally.

## 2023-03-15 LAB
FACT VIII ACT/NOR PPP: 74 % (ref 60–170)
RPR SER QL: NORMAL

## 2023-03-16 ENCOUNTER — OFFICE VISIT (OUTPATIENT)
Dept: URGENT CARE | Facility: CLINIC | Age: 28
End: 2023-03-16
Payer: COMMERCIAL

## 2023-03-16 VITALS
RESPIRATION RATE: 20 BRPM | HEIGHT: 61 IN | OXYGEN SATURATION: 98 % | BODY MASS INDEX: 26.81 KG/M2 | TEMPERATURE: 98 F | WEIGHT: 142 LBS | HEART RATE: 139 BPM | SYSTOLIC BLOOD PRESSURE: 94 MMHG | DIASTOLIC BLOOD PRESSURE: 60 MMHG

## 2023-03-16 DIAGNOSIS — R09.81 NASAL CONGESTION: ICD-10-CM

## 2023-03-16 DIAGNOSIS — R50.9 FEVER, UNSPECIFIED FEVER CAUSE: Primary | ICD-10-CM

## 2023-03-16 LAB
CTP QC/QA: YES
CTP QC/QA: YES
POC MOLECULAR INFLUENZA A AGN: NEGATIVE
POC MOLECULAR INFLUENZA B AGN: NEGATIVE
SARS-COV-2 AG RESP QL IA.RAPID: NEGATIVE

## 2023-03-16 PROCEDURE — 87502 POCT INFLUENZA A/B MOLECULAR: ICD-10-PCS | Mod: QW,S$GLB,, | Performed by: FAMILY MEDICINE

## 2023-03-16 PROCEDURE — 87811 SARS-COV-2 COVID19 W/OPTIC: CPT | Mod: QW,S$GLB,, | Performed by: FAMILY MEDICINE

## 2023-03-16 PROCEDURE — 87502 INFLUENZA DNA AMP PROBE: CPT | Mod: QW,S$GLB,, | Performed by: FAMILY MEDICINE

## 2023-03-16 PROCEDURE — 99203 PR OFFICE/OUTPT VISIT, NEW, LEVL III, 30-44 MIN: ICD-10-PCS | Mod: S$GLB,,, | Performed by: FAMILY MEDICINE

## 2023-03-16 PROCEDURE — 99203 OFFICE O/P NEW LOW 30 MIN: CPT | Mod: S$GLB,,, | Performed by: FAMILY MEDICINE

## 2023-03-16 PROCEDURE — 87811 SARS CORONAVIRUS 2 ANTIGEN POCT, MANUAL READ: ICD-10-PCS | Mod: QW,S$GLB,, | Performed by: FAMILY MEDICINE

## 2023-03-16 RX ORDER — IPRATROPIUM BROMIDE 21 UG/1
2 SPRAY, METERED NASAL 2 TIMES DAILY PRN
Qty: 30 ML | Refills: 0 | Status: SHIPPED | OUTPATIENT
Start: 2023-03-16 | End: 2023-03-22

## 2023-03-16 NOTE — PROGRESS NOTES
"Subjective:       Patient ID: Leticia Mcfadden is a 28 y.o. female.    Vitals:  height is 5' 1" (1.549 m) and weight is 64.4 kg (142 lb). Her oral temperature is 97.8 °F (36.6 °C). Her blood pressure is 94/60 and her pulse is 139 (abnormal). Her respiration is 20 and oxygen saturation is 98%.     Chief Complaint: Sinus Problem    This is a 28 y.o. female who presents today with a chief complaint of  sinus problems x 3 days. Pt state she took sudafed. Pt declined covid and flu testing.     Sinus Problem  This is a recurrent problem. The current episode started in the past 7 days. The problem is unchanged. There has been no fever. Her pain is at a severity of 0/10. She is experiencing no pain. Associated symptoms include congestion, coughing, ear pain, sinus pressure, a sore throat and swollen glands. Pertinent negatives include no chills, diaphoresis, headaches, hoarse voice, neck pain, shortness of breath or sneezing. Past treatments include acetaminophen (sudafed). The treatment provided no relief.   Constitution: Negative for chills and sweating.   HENT:  Positive for ear pain, congestion, sinus pressure and sore throat.    Neck: Negative for neck pain.   Respiratory:  Positive for cough. Negative for shortness of breath.    Allergic/Immunologic: Negative for sneezing.   Neurological:  Negative for headaches.     Objective:      Physical Exam   Constitutional: She is oriented to person, place, and time. No distress. normal  HENT:   Head: Normocephalic and atraumatic.   Ears:   Right Ear: Tympanic membrane, external ear and ear canal normal.   Left Ear: Tympanic membrane, external ear and ear canal normal.   Nose: Rhinorrhea and congestion present.   Mouth/Throat: Mucous membranes are dry. Oropharynx is clear.   Eyes: Conjunctivae are normal. Pupils are equal, round, and reactive to light. Extraocular movement intact   Neck: Neck supple. No neck rigidity present.   Cardiovascular: Normal rate, regular rhythm, normal " heart sounds and normal pulses.   No murmur heard.  Pulmonary/Chest: Effort normal and breath sounds normal. No respiratory distress.   Abdominal: Normal appearance and bowel sounds are normal. She exhibits no distension and no mass. Soft. flat abdomen There is no abdominal tenderness.   Musculoskeletal: Normal range of motion.         General: Normal range of motion.   Neurological: no focal deficit. She is alert, oriented to person, place, and time and at baseline. No cranial nerve deficit or sensory deficit.   Skin: Skin is warm and dry. Capillary refill takes less than 2 seconds. No bruising   Psychiatric: Her behavior is normal. Mood, judgment and thought content normal.   Nursing note and vitals reviewed.      Assessment:Plan:     1. Fever, unspecified fever cause  - POCT Influenza A/B MOLECULAR  - SARS Coronavirus 2 Antigen, POCT Manual Read    2. Nasal congestion  - ipratropium (ATROVENT) 21 mcg (0.03 %) nasal spray; 2 sprays by Each Nostril route 2 (two) times daily as needed for Rhinitis.  Dispense: 30 mL; Refill: 0     All results discuss with pt prior to discharge from clinic

## 2023-03-17 DIAGNOSIS — E61.1 IRON DEFICIENCY: Primary | ICD-10-CM

## 2023-03-17 RX ORDER — FERROUS SULFATE 325(65) MG
325 TABLET, DELAYED RELEASE (ENTERIC COATED) ORAL DAILY
Qty: 30 TABLET | Refills: 1 | Status: SHIPPED | OUTPATIENT
Start: 2023-03-17 | End: 2023-05-16

## 2023-03-22 ENCOUNTER — ROUTINE PRENATAL (OUTPATIENT)
Dept: OBSTETRICS AND GYNECOLOGY | Facility: CLINIC | Age: 28
End: 2023-03-22
Payer: COMMERCIAL

## 2023-03-22 ENCOUNTER — TELEPHONE (OUTPATIENT)
Dept: OBSTETRICS AND GYNECOLOGY | Facility: CLINIC | Age: 28
End: 2023-03-22
Payer: COMMERCIAL

## 2023-03-22 VITALS — SYSTOLIC BLOOD PRESSURE: 98 MMHG | DIASTOLIC BLOOD PRESSURE: 66 MMHG | WEIGHT: 141 LBS | BODY MASS INDEX: 26.64 KG/M2

## 2023-03-22 DIAGNOSIS — Z3A.36 36 WEEKS GESTATION OF PREGNANCY: Primary | ICD-10-CM

## 2023-03-22 DIAGNOSIS — Z34.90 ENCOUNTER FOR ELECTIVE INDUCTION OF LABOR: Primary | ICD-10-CM

## 2023-03-22 PROCEDURE — 99999 PR PBB SHADOW E&M-EST. PATIENT-LVL III: CPT | Mod: PBBFAC,,,

## 2023-03-22 PROCEDURE — 87147 CULTURE TYPE IMMUNOLOGIC: CPT

## 2023-03-22 PROCEDURE — 0502F SUBSEQUENT PRENATAL CARE: CPT | Mod: CPTII,S$GLB,,

## 2023-03-22 PROCEDURE — 0502F PR SUBSEQUENT PRENATAL CARE: ICD-10-PCS | Mod: CPTII,S$GLB,,

## 2023-03-22 PROCEDURE — 87081 CULTURE SCREEN ONLY: CPT

## 2023-03-22 PROCEDURE — 99999 PR PBB SHADOW E&M-EST. PATIENT-LVL III: ICD-10-PCS | Mod: PBBFAC,,,

## 2023-03-22 NOTE — PROGRESS NOTES
Here for routine OB appt at 36w2d, with no complaints.  Reports good FM. Daily FM counts reinforced.   Denies LOF, denies VB, denies contractions.    Reviewed warning signs of Labor and Preeclampsia. GBS done.   IOL discussed and patient would like to be scheduled for 4/14 at 5am.    Consents to be signed at next visit.  RTC in 1 week for PNC.

## 2023-03-22 NOTE — TELEPHONE ENCOUNTER
----- Message from Santo Gómez RN sent at 3/22/2023 11:06 AM CDT -----  Regarding: FW: Induction  Red pt  ----- Message -----  From: Akanksha Glez DNP  Sent: 3/22/2023  10:31 AM CDT  To: Santo Gómez RN  Subject: Induction                                        SANTO AND BLAS:  PLEASE SEND DATE AND TIME TO [facility] TO PUT ON [employer] AND EPIC AND TO [facility] TO PUT ON OB LIST  DON'T FORGET TO CALL PT AND LET HER KNOW ALSO#####          Procedure: answer Y where needed       Induction     Pitocin___Y__     Cytotec__Y__     Balloon____     Other______         Primary____      Repeat____    BTL _____________    Cerclage _________       Indication (elective/other):  Elective    Date desired:  2023  Time desired: 5 am    Due Date:  2023    Dr. Moon patient

## 2023-03-24 LAB — BACTERIA SPEC AEROBE CULT: ABNORMAL

## 2023-03-27 ENCOUNTER — PATIENT MESSAGE (OUTPATIENT)
Dept: MATERNAL FETAL MEDICINE | Facility: CLINIC | Age: 28
End: 2023-03-27
Payer: COMMERCIAL

## 2023-03-28 ENCOUNTER — TELEPHONE (OUTPATIENT)
Dept: PHARMACY | Facility: CLINIC | Age: 28
End: 2023-03-28
Payer: COMMERCIAL

## 2023-03-28 ENCOUNTER — PROCEDURE VISIT (OUTPATIENT)
Dept: MATERNAL FETAL MEDICINE | Facility: CLINIC | Age: 28
End: 2023-03-28
Payer: COMMERCIAL

## 2023-03-28 ENCOUNTER — OFFICE VISIT (OUTPATIENT)
Dept: MATERNAL FETAL MEDICINE | Facility: CLINIC | Age: 28
End: 2023-03-28
Payer: COMMERCIAL

## 2023-03-28 VITALS
SYSTOLIC BLOOD PRESSURE: 107 MMHG | DIASTOLIC BLOOD PRESSURE: 61 MMHG | HEIGHT: 61 IN | BODY MASS INDEX: 26.73 KG/M2 | WEIGHT: 141.56 LBS

## 2023-03-28 DIAGNOSIS — Z14.01 HEMOPHILIA A CARRIER, ASYMPTOMATIC: ICD-10-CM

## 2023-03-28 DIAGNOSIS — Z36.89 ENCOUNTER FOR ULTRASOUND TO CHECK FETAL GROWTH: ICD-10-CM

## 2023-03-28 PROCEDURE — 1159F MED LIST DOCD IN RCRD: CPT | Mod: CPTII,S$GLB,, | Performed by: OBSTETRICS & GYNECOLOGY

## 2023-03-28 PROCEDURE — 3008F BODY MASS INDEX DOCD: CPT | Mod: CPTII,S$GLB,, | Performed by: OBSTETRICS & GYNECOLOGY

## 2023-03-28 PROCEDURE — 3074F PR MOST RECENT SYSTOLIC BLOOD PRESSURE < 130 MM HG: ICD-10-PCS | Mod: CPTII,S$GLB,, | Performed by: OBSTETRICS & GYNECOLOGY

## 2023-03-28 PROCEDURE — 76816 OB US FOLLOW-UP PER FETUS: CPT | Mod: S$GLB,,, | Performed by: OBSTETRICS & GYNECOLOGY

## 2023-03-28 PROCEDURE — 76816 US MFM PROCEDURE (VIEWPOINT): ICD-10-PCS | Mod: S$GLB,,, | Performed by: OBSTETRICS & GYNECOLOGY

## 2023-03-28 PROCEDURE — 3078F PR MOST RECENT DIASTOLIC BLOOD PRESSURE < 80 MM HG: ICD-10-PCS | Mod: CPTII,S$GLB,, | Performed by: OBSTETRICS & GYNECOLOGY

## 2023-03-28 PROCEDURE — 3074F SYST BP LT 130 MM HG: CPT | Mod: CPTII,S$GLB,, | Performed by: OBSTETRICS & GYNECOLOGY

## 2023-03-28 PROCEDURE — 1159F PR MEDICATION LIST DOCUMENTED IN MEDICAL RECORD: ICD-10-PCS | Mod: CPTII,S$GLB,, | Performed by: OBSTETRICS & GYNECOLOGY

## 2023-03-28 PROCEDURE — 99215 OFFICE O/P EST HI 40 MIN: CPT | Mod: 25,S$GLB,, | Performed by: OBSTETRICS & GYNECOLOGY

## 2023-03-28 PROCEDURE — 99215 PR OFFICE/OUTPT VISIT, EST, LEVL V, 40-54 MIN: ICD-10-PCS | Mod: 25,S$GLB,, | Performed by: OBSTETRICS & GYNECOLOGY

## 2023-03-28 PROCEDURE — 3008F PR BODY MASS INDEX (BMI) DOCUMENTED: ICD-10-PCS | Mod: CPTII,S$GLB,, | Performed by: OBSTETRICS & GYNECOLOGY

## 2023-03-28 PROCEDURE — 99999 PR PBB SHADOW E&M-EST. PATIENT-LVL III: CPT | Mod: PBBFAC,,, | Performed by: OBSTETRICS & GYNECOLOGY

## 2023-03-28 PROCEDURE — 3078F DIAST BP <80 MM HG: CPT | Mod: CPTII,S$GLB,, | Performed by: OBSTETRICS & GYNECOLOGY

## 2023-03-28 PROCEDURE — 99999 PR PBB SHADOW E&M-EST. PATIENT-LVL III: ICD-10-PCS | Mod: PBBFAC,,, | Performed by: OBSTETRICS & GYNECOLOGY

## 2023-03-28 NOTE — TELEPHONE ENCOUNTER
Incoming call from Batsheva at [facility] asking about Hemofil A for pt. Stated she will be delivering a baby with a expected delivery date of 4/11/23 and will need on hand in case of post partum bleeds. Informed Batsheva that [facility] can provide drug, but not all supplies needed. Also informed her to have rx sent over and team will work on it. Voiced understanding. Stated she will get with pts nurse and have rx sent here. Will follow for rx.

## 2023-03-28 NOTE — PROGRESS NOTES
Maternal Fetal Medicine follow up consult    SUBJECTIVE:     Leticia Mcfadden is a 28 y.o.  female with IUP at 37w1d  who is seen in follow up consultation by [facility].  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   No changes to medical, surgical, family, social, or obstetric history.    Interval history since last [facility] visit: no changes. The patient reports adequate fetal movement. She denies leakage of fluid, vaginal bleeding, contractions.  The patient denies symptoms of pre-eclampsia including headache, blurred vision, right upper quadrant pain, chest pain, and shortness of breath.     Medications:  PNV  Celexa     OBJECTIVE:     Blood Pressure: 107/61    Ultrasound performed. See viewpoint for full ultrasound report.  Fetal size is AGA with the EFW at the 11% and the AC at the 20%. The EFW is 2592 g.  A limited repeat fetal anatomic survey shows no abnormalities of the structures that were adequately imaged.  The femur length measures in the <1st% bilaterally, at -2SD below the mean. All other long bones were measured and were noted to be normal. The femurs appear normal morphologically and have no evidence of bowing or fractures. The isolated femur length lag is likely due to normal variation in stature and is likely not pathologic.   AFV is normal.     ASSESSMENT/PLAN:     28 y.o.  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 Her brother has severe hemophilia A. Most recent Factor 8 assay 74% (previously 50%).   She denies a history of easy bleeding. She denies complications with previous surgical procedures.  She was previously followed by [facility] bleeding disorders clinic. She reports that she thinks her factor 8 levels have been as low as 40% but is unsure of the  exact number. I do not have access to these records today.    We reviewed the previous recommendations regarding mode of delivery. We discussed the conflicting data regarding optimal mode of delivery in patients who are known hemophilia A carriers with a male fetus whose hemophilia status is unknown. Some societies recommend elective  delivery to decrease the risk of  intracranial hemorrhage; however, it is important to note that  delivery will not eliminate this risk completely. We also reviewed the option for a trial of labor, as an uncomplicated vaginal delivery may not increase the risk of  ICH significantly. The risk of ICH is highest in neonates who are delivered via operative vaginal delivery. The patient desires a trial of labor but understands the need to avoid fetal scalp electrode, operative vaginal delivery. Would advise against a prolonged labor course and would recommend a low threshold to consider  delivery if the patient nears the diagnostic criteria for failed induction of labor or arrest of dilation or descent.     The patient has not seen hematology at [facility]. I will urgently request records from [facility] and I have communicated with the [facility] hematology department, who will see the patient next week for discussion of peripartum management.     I discussed the patient with OB anesthesia, as she has not seen them prenatally. They will review the patient's chart and determine if consultation is warranted. We discussed the recommendation against neuraxial anesthesia if her factor 8 levels are low (<50%).     If patient presents in labor prior to a plan being developed by hematology:  1. Obtain factor 8 assay levels at the time of hospital admission  2. If factor 8 levels <50IU/dL, or if any concern for bleeding at the time of admission or during delivery, administer Factor 8 concentrate (consult hematology and pharmacy for assistance with dosing)  3. If  bleeding is uncontrolled, administer cryoprecipitate. Consider DDAVP after discussion with hematology.  4. Administer TXA 1gm IV after delivery at the time of umbilical cord clamping. Continue TXA 25mg/kg PO TID for 5-10 days after delivery if blood loss remains significant.  5. Consult hematology while admitted. The patient may benefit from prophylactic administration of factor 8 concentrate after delivery, even if factor 8 levels are normal at the time of admission, to account for anticipated abrupt decrease in factor 8 levels after delivery.     Will await additional recommendations from hematology. The above plan may change after the patient has been seen by hematology.    The patient desires  genetic assessment of the  to determine whether he is affected.     Per genetic counselor - instructions for sending cord blood for  genetic assessment:  Send Factor VIII clotting activity from an umbilical cord blood sample obtained by venipuncture of the umbilical vein drawn into a standard blue top tube.    Please see original [facility] consultation for full details regarding management recommendations of these and other obstetric co-morbidities    FOLLOW UP:   No further ultrasounds or visits were scheduled    Today I have spent 60 minutes in the care of the patient. This includes face to face time and non-face to face time preparing to see the patient (eg, review of tests), obtaining and/or reviewing separately obtained history, documenting clinical information in the electronic or other health record, independently interpreting results and communicating results to the patient/family/caregiver, or care coordination.     Nakita Treviño MD  Maternal Fetal Medicine

## 2023-03-28 NOTE — ASSESSMENT & PLAN NOTE
See previous [facility] notes for original consultation.    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 Her brother has severe hemophilia A. Most recent Factor 8 assay 74% (previously 50%).   She denies a history of easy bleeding. She denies complications with previous surgical procedures.  She was previously followed by [facility] bleeding disorders clinic. She reports that she thinks her factor 8 levels have been as low as 40% but is unsure of the exact number. I do not have access to these records today.    We reviewed the previous recommendations regarding mode of delivery. We discussed the conflicting data regarding optimal mode of delivery in patients who are known hemophilia A carriers with a male fetus whose hemophilia status is unknown. Some societies recommend elective  delivery to decrease the risk of  intracranial hemorrhage; however, it is important to note that  delivery will not eliminate this risk completely. We also reviewed the option for a trial of labor, as an uncomplicated vaginal delivery may not increase the risk of  ICH significantly. The risk of ICH is highest in neonates who are delivered via operative vaginal delivery. The patient desires a trial of labor but understands the need to avoid fetal scalp electrode, operative vaginal delivery. Would advise against a prolonged labor course and would recommend a low threshold to consider  delivery if the patient nears the diagnostic criteria for failed induction of labor or arrest of dilation or descent.     The patient has not seen hematology at [facility]. I will urgently request records from [facility] and I have communicated with the [facility] hematology department, who will see the patient next week for discussion of peripartum management.     I discussed the patient with OB anesthesia, as she has not seen them prenatally. They will review the patient's chart and determine if consultation is warranted. We  discussed the recommendation against neuraxial anesthesia if her factor 8 levels are low (<50%).     If patient presents in labor prior to a plan being developed by hematology:  1. Obtain factor 8 assay levels at the time of hospital admission  2. If factor 8 levels <50IU/dL, or if any concern for bleeding at the time of admission or during delivery, administer Factor 8 concentrate (consult hematology and pharmacy for assistance with dosing)  3. If bleeding is uncontrolled, administer cryoprecipitate. Consider DDAVP after discussion with hematology.  4. Administer TXA 1gm IV after delivery at the time of umbilical cord clamping. Continue TXA 25mg/kg PO TID for 5-10 days after delivery if blood loss remains significant.  5. Consult hematology while admitted. The patient may benefit from prophylactic administration of factor 8 concentrate after delivery, even if factor 8 levels are normal at the time of admission, to account for anticipated abrupt decrease in factor 8 levels after delivery.     Will await additional recommendations from hematology. The above plan may change after the patient has been seen by hematology.    The patient desires  genetic assessment of the  to determine whether he is affected.

## 2023-03-29 ENCOUNTER — PATIENT MESSAGE (OUTPATIENT)
Dept: OBSTETRICS AND GYNECOLOGY | Facility: CLINIC | Age: 28
End: 2023-03-29
Payer: COMMERCIAL

## 2023-04-04 ENCOUNTER — ROUTINE PRENATAL (OUTPATIENT)
Dept: OBSTETRICS AND GYNECOLOGY | Facility: CLINIC | Age: 28
End: 2023-04-04
Payer: COMMERCIAL

## 2023-04-04 VITALS
WEIGHT: 145.94 LBS | SYSTOLIC BLOOD PRESSURE: 110 MMHG | DIASTOLIC BLOOD PRESSURE: 68 MMHG | BODY MASS INDEX: 27.58 KG/M2

## 2023-04-04 DIAGNOSIS — Z3A.38 38 WEEKS GESTATION OF PREGNANCY: ICD-10-CM

## 2023-04-04 DIAGNOSIS — Z34.83 ENCOUNTER FOR SUPERVISION OF OTHER NORMAL PREGNANCY, THIRD TRIMESTER: Primary | ICD-10-CM

## 2023-04-04 DIAGNOSIS — F41.9 ANXIETY: ICD-10-CM

## 2023-04-04 DIAGNOSIS — Z14.01 HEMOPHILIA CARRIER: ICD-10-CM

## 2023-04-04 PROCEDURE — 99999 PR PBB SHADOW E&M-EST. PATIENT-LVL III: CPT | Mod: PBBFAC,,, | Performed by: OBSTETRICS & GYNECOLOGY

## 2023-04-04 PROCEDURE — 99999 PR PBB SHADOW E&M-EST. PATIENT-LVL III: ICD-10-PCS | Mod: PBBFAC,,, | Performed by: OBSTETRICS & GYNECOLOGY

## 2023-04-04 PROCEDURE — 0502F PR SUBSEQUENT PRENATAL CARE: ICD-10-PCS | Mod: CPTII,S$GLB,, | Performed by: OBSTETRICS & GYNECOLOGY

## 2023-04-04 PROCEDURE — 0502F SUBSEQUENT PRENATAL CARE: CPT | Mod: CPTII,S$GLB,, | Performed by: OBSTETRICS & GYNECOLOGY

## 2023-04-04 RX ORDER — CITALOPRAM 20 MG/1
20 TABLET, FILM COATED ORAL DAILY
Qty: 90 TABLET | Refills: 0 | Status: SHIPPED | OUTPATIENT
Start: 2023-04-04 | End: 2023-07-03 | Stop reason: SDUPTHER

## 2023-04-04 NOTE — PROGRESS NOTES
38w1d  No complaints. Denies vaginal bleeding, abnormal discharge or pelvic pain. Good fetal movements with kick counts met daily.  Irregular mild cramping. No contractions.  Hemophilia carrier: Repeat Factor 8 level with HIV/RPR/CBC today. All notes/labs obtained from [facility] and scanned to media.  Delivery recs for mom/baby in media and reviewed with patient/spouse.  GBS positive. Labs otherwise stable.  Mood stable on Celexa and refilled today.  Discussed MFM request to move up IOL due to needs of mom/baby and waiting to hear back from scheduling.  RTC 2 weeks pp

## 2023-04-05 ENCOUNTER — PATIENT MESSAGE (OUTPATIENT)
Dept: OBSTETRICS AND GYNECOLOGY | Facility: CLINIC | Age: 28
End: 2023-04-05
Payer: COMMERCIAL

## 2023-04-05 ENCOUNTER — TELEPHONE (OUTPATIENT)
Dept: OBSTETRICS AND GYNECOLOGY | Facility: CLINIC | Age: 28
End: 2023-04-05
Payer: COMMERCIAL

## 2023-04-09 ENCOUNTER — DOCUMENTATION ONLY (OUTPATIENT)
Dept: OBSTETRICS AND GYNECOLOGY | Facility: HOSPITAL | Age: 28
End: 2023-04-09
Payer: COMMERCIAL

## 2023-04-09 ENCOUNTER — PATIENT MESSAGE (OUTPATIENT)
Dept: OBSTETRICS AND GYNECOLOGY | Facility: OTHER | Age: 28
End: 2023-04-09
Payer: COMMERCIAL

## 2023-04-10 ENCOUNTER — HOSPITAL ENCOUNTER (INPATIENT)
Facility: OTHER | Age: 28
LOS: 2 days | Discharge: HOME | End: 2023-04-12
Attending: STUDENT IN AN ORGANIZED HEALTH CARE EDUCATION/TRAINING PROGRAM | Admitting: OBSTETRICS & GYNECOLOGY
Payer: COMMERCIAL

## 2023-04-10 ENCOUNTER — ANESTHESIA EVENT (OUTPATIENT)
Dept: OBSTETRICS AND GYNECOLOGY | Facility: OTHER | Age: 28
End: 2023-04-10
Payer: COMMERCIAL

## 2023-04-10 ENCOUNTER — ANESTHESIA (OUTPATIENT)
Dept: OBSTETRICS AND GYNECOLOGY | Facility: OTHER | Age: 28
End: 2023-04-10
Payer: COMMERCIAL

## 2023-04-10 ENCOUNTER — ANESTHESIA (OUTPATIENT)
Dept: OBSTETRICS AND GYNECOLOGY | Facility: OTHER | Age: 28
End: 2023-04-10

## 2023-04-10 ENCOUNTER — ANESTHESIA EVENT (OUTPATIENT)
Dept: OBSTETRICS AND GYNECOLOGY | Facility: OTHER | Age: 28
End: 2023-04-10

## 2023-04-10 DIAGNOSIS — Z34.90 ENCOUNTER FOR ELECTIVE INDUCTION OF LABOR: ICD-10-CM

## 2023-04-10 DIAGNOSIS — Z14.01 HEMOPHILIA A CARRIER, ASYMPTOMATIC: Primary | ICD-10-CM

## 2023-04-10 LAB
ABO + RH BLD: ABNORMAL
BASOPHILS # BLD AUTO: 0.02 K/UL (ref 0–0.2)
BASOPHILS NFR BLD: 0.2 % (ref 0–1.9)
BLD GP AB SCN CELLS X3 SERPL QL: ABNORMAL
BLOOD GROUP ANTIBODIES SERPL: NORMAL
DIFFERENTIAL METHOD: ABNORMAL
EOSINOPHIL # BLD AUTO: 0.1 K/UL (ref 0–0.5)
EOSINOPHIL NFR BLD: 0.8 % (ref 0–8)
ERYTHROCYTE [DISTWIDTH] IN BLOOD BY AUTOMATED COUNT: 13.8 % (ref 11.5–14.5)
FACT VIII ACT/NOR PPP: 74 % (ref 60–170)
HCT VFR BLD AUTO: 35 % (ref 37–48.5)
HGB BLD-MCNC: 11.7 G/DL (ref 12–16)
IMM GRANULOCYTES # BLD AUTO: 0.04 K/UL (ref 0–0.04)
IMM GRANULOCYTES NFR BLD AUTO: 0.4 % (ref 0–0.5)
LYMPHOCYTES # BLD AUTO: 2.2 K/UL (ref 1–4.8)
LYMPHOCYTES NFR BLD: 22.3 % (ref 18–48)
MCH RBC QN AUTO: 31.1 PG (ref 27–31)
MCHC RBC AUTO-ENTMCNC: 33.4 G/DL (ref 32–36)
MCV RBC AUTO: 93 FL (ref 82–98)
MONOCYTES # BLD AUTO: 0.8 K/UL (ref 0.3–1)
MONOCYTES NFR BLD: 7.8 % (ref 4–15)
NEUTROPHILS # BLD AUTO: 6.9 K/UL (ref 1.8–7.7)
NEUTROPHILS NFR BLD: 68.5 % (ref 38–73)
NRBC BLD-RTO: 0 /100 WBC
PLATELET # BLD AUTO: 169 K/UL (ref 150–450)
PMV BLD AUTO: 10.1 FL (ref 9.2–12.9)
RBC # BLD AUTO: 3.76 M/UL (ref 4–5.4)
RH BLD: NORMAL
SPECIMEN OUTDATE: ABNORMAL
WBC # BLD AUTO: 10 K/UL (ref 3.9–12.7)

## 2023-04-10 PROCEDURE — 59200 INSERT CERVICAL DILATOR: CPT

## 2023-04-10 PROCEDURE — 63600531 PHARM REV CODE 636 NO ALT 250 W HCPCS: Mod: JZ,JG | Performed by: STUDENT IN AN ORGANIZED HEALTH CARE EDUCATION/TRAINING PROGRAM

## 2023-04-10 PROCEDURE — 99223 1ST HOSP IP/OBS HIGH 75: CPT | Mod: ,,, | Performed by: STUDENT IN AN ORGANIZED HEALTH CARE EDUCATION/TRAINING PROGRAM

## 2023-04-10 PROCEDURE — 86901 BLOOD TYPING SEROLOGIC RH(D): CPT | Performed by: STUDENT IN AN ORGANIZED HEALTH CARE EDUCATION/TRAINING PROGRAM

## 2023-04-10 PROCEDURE — 63600175 PHARM REV CODE 636 W HCPCS

## 2023-04-10 PROCEDURE — 59400 PR FULL ROUT OBSTE CARE,VAGINAL DELIV: ICD-10-PCS | Mod: GB,,, | Performed by: OBSTETRICS & GYNECOLOGY

## 2023-04-10 PROCEDURE — 27200710 HC EPIDURAL INFUSION PUMP SET: Performed by: ANESTHESIOLOGY

## 2023-04-10 PROCEDURE — 85240 CLOT FACTOR VIII AHG 1 STAGE: CPT

## 2023-04-10 PROCEDURE — 25000003 PHARM REV CODE 250

## 2023-04-10 PROCEDURE — 11000001 HC ACUTE MED/SURG PRIVATE ROOM

## 2023-04-10 PROCEDURE — 59400 PRA FULL ROUT OBSTE CARE,VAGINAL DELIV: ICD-10-PCS | Mod: ,,, | Performed by: ANESTHESIOLOGY

## 2023-04-10 PROCEDURE — 85025 COMPLETE CBC W/AUTO DIFF WBC: CPT

## 2023-04-10 PROCEDURE — 86920 COMPATIBILITY TEST SPIN: CPT

## 2023-04-10 PROCEDURE — 25000003 PHARM REV CODE 250: Performed by: ANESTHESIOLOGY

## 2023-04-10 PROCEDURE — 62326 NJX INTERLAMINAR LMBR/SAC: CPT | Performed by: ANESTHESIOLOGY

## 2023-04-10 PROCEDURE — C1751 CATH, INF, PER/CENT/MIDLINE: HCPCS | Performed by: ANESTHESIOLOGY

## 2023-04-10 PROCEDURE — 86900 BLOOD TYPING SEROLOGIC ABO: CPT | Performed by: STUDENT IN AN ORGANIZED HEALTH CARE EDUCATION/TRAINING PROGRAM

## 2023-04-10 PROCEDURE — 86870 RBC ANTIBODY IDENTIFICATION: CPT | Performed by: STUDENT IN AN ORGANIZED HEALTH CARE EDUCATION/TRAINING PROGRAM

## 2023-04-10 PROCEDURE — 99223 PR INITIAL HOSPITAL CARE,LEVL III: ICD-10-PCS | Mod: ,,, | Performed by: STUDENT IN AN ORGANIZED HEALTH CARE EDUCATION/TRAINING PROGRAM

## 2023-04-10 PROCEDURE — 59400 OBSTETRICAL CARE: CPT | Mod: ,,, | Performed by: ANESTHESIOLOGY

## 2023-04-10 PROCEDURE — 59400 OBSTETRICAL CARE: CPT | Mod: GB,,, | Performed by: OBSTETRICS & GYNECOLOGY

## 2023-04-10 RX ORDER — OXYTOCIN/RINGER'S LACTATE 30/500 ML
95 PLASTIC BAG, INJECTION (ML) INTRAVENOUS ONCE AS NEEDED
Status: DISCONTINUED | OUTPATIENT
Start: 2023-04-10 | End: 2023-04-12 | Stop reason: HOSPADM

## 2023-04-10 RX ORDER — SIMETHICONE 80 MG
1 TABLET,CHEWABLE ORAL 4 TIMES DAILY PRN
Status: DISCONTINUED | OUTPATIENT
Start: 2023-04-10 | End: 2023-04-11 | Stop reason: SDUPTHER

## 2023-04-10 RX ORDER — TRANEXAMIC ACID 10 MG/ML
1000 INJECTION, SOLUTION INTRAVENOUS ONCE AS NEEDED
Status: CANCELLED | OUTPATIENT
Start: 2023-04-10 | End: 2034-09-06

## 2023-04-10 RX ORDER — FENTANYL/BUPIVACAINE/NS/PF 2MCG/ML-.1
PLASTIC BAG, INJECTION (ML) INJECTION CONTINUOUS PRN
Status: DISCONTINUED | OUTPATIENT
Start: 2023-04-10 | End: 2023-04-10

## 2023-04-10 RX ORDER — SODIUM CITRATE AND CITRIC ACID MONOHYDRATE 334; 500 MG/5ML; MG/5ML
30 SOLUTION ORAL ONCE
Status: CANCELLED | OUTPATIENT
Start: 2023-04-10 | End: 2023-04-10

## 2023-04-10 RX ORDER — TRANEXAMIC ACID 10 MG/ML
1000 INJECTION, SOLUTION INTRAVENOUS ONCE AS NEEDED
Status: DISCONTINUED | OUTPATIENT
Start: 2023-04-10 | End: 2023-04-12 | Stop reason: HOSPADM

## 2023-04-10 RX ORDER — FENTANYL/BUPIVACAINE/NS/PF 2MCG/ML-.1
PLASTIC BAG, INJECTION (ML) INJECTION
Status: COMPLETED
Start: 2023-04-10 | End: 2023-04-10

## 2023-04-10 RX ORDER — LANOLIN ALCOHOL/MO/W.PET/CERES
1 CREAM (GRAM) TOPICAL DAILY
Status: DISCONTINUED | OUTPATIENT
Start: 2023-04-10 | End: 2023-04-12 | Stop reason: HOSPADM

## 2023-04-10 RX ORDER — OXYTOCIN/RINGER'S LACTATE 30/500 ML
0-30 PLASTIC BAG, INJECTION (ML) INTRAVENOUS CONTINUOUS
Status: DISCONTINUED | OUTPATIENT
Start: 2023-04-10 | End: 2023-04-12 | Stop reason: HOSPADM

## 2023-04-10 RX ORDER — CARBOPROST TROMETHAMINE 250 UG/ML
250 INJECTION, SOLUTION INTRAMUSCULAR
Status: CANCELLED | OUTPATIENT
Start: 2023-04-10

## 2023-04-10 RX ORDER — OXYTOCIN/RINGER'S LACTATE 30/500 ML
95 PLASTIC BAG, INJECTION (ML) INTRAVENOUS ONCE
Status: DISCONTINUED | OUTPATIENT
Start: 2023-04-10 | End: 2023-04-11

## 2023-04-10 RX ORDER — MISOPROSTOL 200 UG/1
800 TABLET ORAL ONCE AS NEEDED
Status: COMPLETED | OUTPATIENT
Start: 2023-04-10 | End: 2023-04-10

## 2023-04-10 RX ORDER — DIPHENHYDRAMINE HYDROCHLORIDE 50 MG/ML
12.5 INJECTION INTRAMUSCULAR; INTRAVENOUS EVERY 4 HOURS PRN
Status: CANCELLED | OUTPATIENT
Start: 2023-04-10

## 2023-04-10 RX ORDER — OXYTOCIN 10 [USP'U]/ML
10 INJECTION, SOLUTION INTRAMUSCULAR; INTRAVENOUS ONCE AS NEEDED
Status: CANCELLED | OUTPATIENT
Start: 2023-04-10 | End: 2034-09-06

## 2023-04-10 RX ORDER — SODIUM CHLORIDE 0.9 % (FLUSH) 0.9 %
10 SYRINGE (ML) INJECTION
Status: CANCELLED | OUTPATIENT
Start: 2023-04-10

## 2023-04-10 RX ORDER — PROCHLORPERAZINE EDISYLATE 5 MG/ML
5 INJECTION INTRAMUSCULAR; INTRAVENOUS EVERY 6 HOURS PRN
Status: CANCELLED | OUTPATIENT
Start: 2023-04-10

## 2023-04-10 RX ORDER — CEFAZOLIN SODIUM 2 G/50ML
2 SOLUTION INTRAVENOUS ONCE AS NEEDED
Status: DISCONTINUED | OUTPATIENT
Start: 2023-04-10 | End: 2023-04-12 | Stop reason: HOSPADM

## 2023-04-10 RX ORDER — ONDANSETRON 2 MG/ML
8 INJECTION INTRAMUSCULAR; INTRAVENOUS ONCE
Status: COMPLETED | OUTPATIENT
Start: 2023-04-10 | End: 2023-04-10

## 2023-04-10 RX ORDER — FENTANYL/BUPIVACAINE/NS/PF 2MCG/ML-.1
PLASTIC BAG, INJECTION (ML) INJECTION CONTINUOUS
Status: CANCELLED | OUTPATIENT
Start: 2023-04-10

## 2023-04-10 RX ORDER — FAMOTIDINE 10 MG/ML
20 INJECTION INTRAVENOUS ONCE
Status: CANCELLED | OUTPATIENT
Start: 2023-04-10 | End: 2023-04-10

## 2023-04-10 RX ORDER — SODIUM CHLORIDE, SODIUM LACTATE, POTASSIUM CHLORIDE, CALCIUM CHLORIDE 600; 310; 30; 20 MG/100ML; MG/100ML; MG/100ML; MG/100ML
INJECTION, SOLUTION INTRAVENOUS CONTINUOUS
Status: DISCONTINUED | OUTPATIENT
Start: 2023-04-10 | End: 2023-04-12 | Stop reason: HOSPADM

## 2023-04-10 RX ORDER — ONDANSETRON 8 MG/1
8 TABLET, ORALLY DISINTEGRATING ORAL EVERY 8 HOURS PRN
Status: CANCELLED | OUTPATIENT
Start: 2023-04-10

## 2023-04-10 RX ORDER — CALCIUM CARBONATE 200(500)MG
500 TABLET,CHEWABLE ORAL 3 TIMES DAILY PRN
Status: DISCONTINUED | OUTPATIENT
Start: 2023-04-10 | End: 2023-04-12 | Stop reason: HOSPADM

## 2023-04-10 RX ORDER — OXYTOCIN/RINGER'S LACTATE 30/500 ML
334 PLASTIC BAG, INJECTION (ML) INTRAVENOUS ONCE AS NEEDED
Status: CANCELLED | OUTPATIENT
Start: 2023-04-10 | End: 2034-09-06

## 2023-04-10 RX ORDER — CITALOPRAM 20 MG/1
20 TABLET, FILM COATED ORAL DAILY
Status: DISCONTINUED | OUTPATIENT
Start: 2023-04-10 | End: 2023-04-12 | Stop reason: HOSPADM

## 2023-04-10 RX ORDER — OXYTOCIN/RINGER'S LACTATE 30/500 ML
334 PLASTIC BAG, INJECTION (ML) INTRAVENOUS ONCE
Status: DISCONTINUED | OUTPATIENT
Start: 2023-04-10 | End: 2023-04-11

## 2023-04-10 RX ORDER — PROCHLORPERAZINE EDISYLATE 5 MG/ML
5 INJECTION INTRAMUSCULAR; INTRAVENOUS EVERY 6 HOURS PRN
Status: DISCONTINUED | OUTPATIENT
Start: 2023-04-10 | End: 2023-04-12 | Stop reason: HOSPADM

## 2023-04-10 RX ORDER — MISOPROSTOL 200 UG/1
800 TABLET ORAL ONCE AS NEEDED
Status: CANCELLED | OUTPATIENT
Start: 2023-04-10 | End: 2034-09-06

## 2023-04-10 RX ORDER — OXYTOCIN/RINGER'S LACTATE 30/500 ML
334 PLASTIC BAG, INJECTION (ML) INTRAVENOUS ONCE AS NEEDED
Status: DISCONTINUED | OUTPATIENT
Start: 2023-04-10 | End: 2023-04-12 | Stop reason: HOSPADM

## 2023-04-10 RX ORDER — METHYLERGONOVINE MALEATE 0.2 MG/ML
200 INJECTION INTRAVENOUS
Status: DISCONTINUED | OUTPATIENT
Start: 2023-04-10 | End: 2023-04-12 | Stop reason: HOSPADM

## 2023-04-10 RX ORDER — LIDOCAINE HYDROCHLORIDE 10 MG/ML
10 INJECTION INFILTRATION; PERINEURAL ONCE AS NEEDED
Status: DISCONTINUED | OUTPATIENT
Start: 2023-04-10 | End: 2023-04-12 | Stop reason: HOSPADM

## 2023-04-10 RX ORDER — OXYTOCIN/RINGER'S LACTATE 30/500 ML
95 PLASTIC BAG, INJECTION (ML) INTRAVENOUS ONCE AS NEEDED
Status: CANCELLED | OUTPATIENT
Start: 2023-04-10 | End: 2034-09-06

## 2023-04-10 RX ORDER — MISOPROSTOL 200 UG/1
800 TABLET ORAL ONCE AS NEEDED
Status: DISCONTINUED | OUTPATIENT
Start: 2023-04-10 | End: 2023-04-12 | Stop reason: HOSPADM

## 2023-04-10 RX ORDER — TRANEXAMIC ACID 10 MG/ML
1000 INJECTION, SOLUTION INTRAVENOUS ONCE AS NEEDED
Status: COMPLETED | OUTPATIENT
Start: 2023-04-10 | End: 2023-04-10

## 2023-04-10 RX ORDER — MISOPROSTOL 200 UG/1
800 TABLET ORAL ONCE AS NEEDED
Status: CANCELLED | OUTPATIENT
Start: 2023-04-10

## 2023-04-10 RX ORDER — METHYLERGONOVINE MALEATE 0.2 MG/ML
200 INJECTION INTRAVENOUS
Status: CANCELLED | OUTPATIENT
Start: 2023-04-10

## 2023-04-10 RX ORDER — ONDANSETRON 8 MG/1
8 TABLET, ORALLY DISINTEGRATING ORAL EVERY 8 HOURS PRN
Status: DISCONTINUED | OUTPATIENT
Start: 2023-04-10 | End: 2023-04-12 | Stop reason: HOSPADM

## 2023-04-10 RX ORDER — CARBOPROST TROMETHAMINE 250 UG/ML
250 INJECTION, SOLUTION INTRAMUSCULAR
Status: DISCONTINUED | OUTPATIENT
Start: 2023-04-10 | End: 2023-04-12 | Stop reason: HOSPADM

## 2023-04-10 RX ORDER — OXYTOCIN 10 [USP'U]/ML
10 INJECTION, SOLUTION INTRAMUSCULAR; INTRAVENOUS ONCE AS NEEDED
Status: DISCONTINUED | OUTPATIENT
Start: 2023-04-10 | End: 2023-04-12 | Stop reason: HOSPADM

## 2023-04-10 RX ADMIN — DEXTROSE MONOHYDRATE 3 MILLION UNITS: 50 INJECTION, SOLUTION INTRAVENOUS at 01:04

## 2023-04-10 RX ADMIN — Medication 2 MILLI-UNITS/MIN: at 05:04

## 2023-04-10 RX ADMIN — MISOPROSTOL 50 MCG: 100 TABLET ORAL at 01:04

## 2023-04-10 RX ADMIN — DEXTROSE MONOHYDRATE 3 MILLION UNITS: 50 INJECTION, SOLUTION INTRAVENOUS at 05:04

## 2023-04-10 RX ADMIN — CITALOPRAM HYDROBROMIDE 20 MG: 20 TABLET ORAL at 09:04

## 2023-04-10 RX ADMIN — ONDANSETRON 8 MG: 2 INJECTION INTRAMUSCULAR; INTRAVENOUS at 10:04

## 2023-04-10 RX ADMIN — ANTIHEMOPHILIC FACTOR HUMAN 1820 UNITS: KIT at 06:04

## 2023-04-10 RX ADMIN — Medication 8 ML/HR: at 09:04

## 2023-04-10 RX ADMIN — DEXTROSE MONOHYDRATE 5 MILLION UNITS: 5 INJECTION INTRAVENOUS at 01:04

## 2023-04-10 RX ADMIN — FERROUS SULFATE TAB 325 MG (65 MG ELEMENTAL FE) 1 EACH: 325 (65 FE) TAB at 09:04

## 2023-04-10 RX ADMIN — DEXTROSE MONOHYDRATE 3 MILLION UNITS: 50 INJECTION, SOLUTION INTRAVENOUS at 09:04

## 2023-04-10 RX ADMIN — MISOPROSTOL 800 MCG: 200 TABLET ORAL at 06:04

## 2023-04-10 RX ADMIN — SODIUM CHLORIDE, POTASSIUM CHLORIDE, SODIUM LACTATE AND CALCIUM CHLORIDE: 600; 310; 30; 20 INJECTION, SOLUTION INTRAVENOUS at 01:04

## 2023-04-10 RX ADMIN — SODIUM CHLORIDE, POTASSIUM CHLORIDE, SODIUM LACTATE AND CALCIUM CHLORIDE: 600; 310; 30; 20 INJECTION, SOLUTION INTRAVENOUS at 09:04

## 2023-04-10 RX ADMIN — SODIUM CHLORIDE, POTASSIUM CHLORIDE, SODIUM LACTATE AND CALCIUM CHLORIDE: 600; 310; 30; 20 INJECTION, SOLUTION INTRAVENOUS at 05:04

## 2023-04-10 RX ADMIN — TRANEXAMIC ACID 1000 MG: 10 INJECTION, SOLUTION INTRAVENOUS at 06:04

## 2023-04-10 NOTE — PROGRESS NOTES
LABOR NOTE    S:  Complaints: No.  Epidural working:  not applicable  Stark bulb out earlier this morning    O: [unfilled]      FHT: Cat 1 (reassuring), baseline 140-145, mod BTBV, + accels, - decels  CTX: q 5 minutes  SVE: 5/70/-3      ASSESSMENT:   28 y.o.  at 39w0d, Hemophilia carrier    FHT reassuring    There are no hospital problems to display for this patient.      TIMELINE:  0155: 1/70/-3   0845: 5/70/-3    PLAN:    Continue Close Maternal/Fetal Monitoring  Pitocin Augmentation per protocol  Plan epidural per anesthesia then AROM      Maren Moon MD

## 2023-04-10 NOTE — PROGRESS NOTES
HISTORY AND PHYSICAL                                                OBSTETRICS          Subjective:      Leticia Mcfadden is a 28 y.o.  female with IUP at 39w0d weeks gestation who presents to L&D for induction of labor. Pertinent medical history for this pregnancy includes hemophilia carrier followed by [facility], anxiety stable on Celexa, and history of miscarriage x 2.  She denies contractions, vaginal bleeding, leakage of fluid.  Reports normal fetal movement. Care this pregnancy has been with Dr. 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 90 tablet 0    ferrous sulfate 325 (65 FE) MG EC tablet Take 1 tablet (325 mg total) by mouth once daily. 30 tablet 1    nystatin (MYCOSTATIN) powder Apply to affected area 3 times daily 60 g 0    prenatal 25/iron fum/folic/dha (PRENATAL-1 ORAL) Take by mouth.      tranexamic acid (LYSTEDA) 650 mg tablet Take 2 tablets (1,300 mg total) by mouth 3 (three) times daily for 14 days 84 tablet 0     No current facility-administered medications on file prior to visit.       SH:   Social History     Socioeconomic History    Marital status:    Tobacco Use    Smoking status: Never    Smokeless tobacco: Never   Substance and Sexual Activity    Alcohol use: Never    Drug use: Never    Sexual activity: Yes     Partners: Male     Birth control/protection: None       FH:   Family History   Problem Relation Age of Onset    Diabetes Father        OBHx:   OB History    Para Term  AB Living   3 0 0 0 2 0    SAB IAB Ectopic Multiple Live Births   2 0 0 0 0      # Outcome Date GA Lbr Cliff/2nd Weight Sex Delivery Anes PTL Lv   3 Current            2 SAB- Conjoined twins 22 6w0d          1 SAB                GYNHx: Age of Menarche:14  No abnormal pap or STDs    Objective:     Wt Readings from Last 3 Encounters:   23 66.2 kg (145 lb 15.1 oz)   23 64.2 kg (141 lb 8.6 oz)   23 64 kg (140 lb 15.8 oz)     Temp Readings from Last 3 Encounters:   23 97.8 °F (36.6 °C) (Oral)     BP Readings from Last 3 Encounters:   23 110/68   23 107/61   23 98/66         General:   alert and cooperative   HEENT:  normocephalic, atraumatic   Lungs:   clear to auscultation bilaterally   Heart:   regular rate and rhythm, S1, S2 normal   Abdomen:  gravid, non-tender   Extremities non-tender, no edema   Derm: no rashes or lesions   Psych: appropriate mood and affect   Pelvis:  adequate    SVE: /-3  Presentation: vertex   FHT: 138 bpm       Lab Review  Blood Type A NEG  GBBS: positive   Rubella:   Lab Results   Component Value Date    RUBELLAIGGAN 15.4 2022    immune  RPR:   RPR   Date Value Ref Range Status   2023 Non-reactive Non-reactive Final      HIV:   Lab Results   Component Value Date    [id number] Non-reactive 2023     HepB:   Hepatitis B Surface Ag   Date Value Ref Range Status   2022 Non-reactive Non-reactive Final        Assessment:     28 y.o.  at 39w0d weeks gestation here for induction of labor   Plan:        1. Risks, benefits, alternatives and possible complications have been discussed in detail with the patient. All questions have been answered, and Ms. Mcfadden has voiced understanding and agrees to the treatment plan.  2. Consents signed and in chart.  3. Admit to Labor and Delivery unit for induction of labor.  4. 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 Consult hematology upon admit.  5. GBS positive: Plan  PCN in labor  6. Anxiety: stable on Celexa. Plan 1-2 week pp mood check      MD Maren Moncada MD

## 2023-04-10 NOTE — ANESTHESIA PROCEDURE NOTES
Epidural    Patient location during procedure: OB   Reason for block: primary anesthetic   Reason for block: labor analgesia requested by patient and obstetrician  Diagnosis: Active Labor   Start time: 4/10/2023 9:15 AM  Timeout: 4/10/2023 9:14 AM  End time: 4/10/2023 9:18 AM  Surgery related to: Vaginal Delivery    Staffing  Performing Provider: Luis Rosas MD  Authorizing Provider: Luis Rosas MD        Preanesthetic Checklist  Completed: patient identified, IV checked, site marked, risks and benefits discussed, surgical consent, monitors and equipment checked, pre-op evaluation, timeout performed, anesthesia consent given, hand hygiene performed and patient being monitored  Preparation  Patient position: sitting  Prep: ChloraPrep  Patient monitoring: Pulse Ox and Blood Pressure  Reason for block: primary anesthetic   Epidural  Skin Anesthetic: lidocaine 1%  Skin Wheal: 3 mL  Administration type: continuous  Approach: midline  Interspace: L3-4    Injection technique: MANAN saline  Needle and Epidural Catheter  Needle type: Tuohy   Needle gauge: 17  Needle length: 3.5 inches  Needle insertion depth: 4 cm  Catheter type: springwound and multi-orifice  Catheter size: 19 G  Catheter at skin depth: 9 cm  Insertion Attempts: 1  Test dose: 3 mL of lidocaine 1.5% with Epi 1-to-200,000  Additional Documentation: incremental injection, negative aspiration for heme and CSF, no paresthesia on injection, no signs/symptoms of IV or SA injection, no significant pain on injection and no significant complaints from patient  Needle localization: anatomical landmarks  Medications:  Volume per aspiration: 5 mL  Time between aspirations: 5 minutes   Assessment  Ease of block: easy  Patient's tolerance of the procedure: comfortable throughout block and no complaints No inadvertent dural puncture with Tuohy.  Dural puncture performed with spinal needle.

## 2023-04-10 NOTE — ASSESSMENT & PLAN NOTE
Detailed guidelines by juan in the media, will follow recs  Reassuring history- uncomplicated course post op after wisdom tooth extraction, DnC without factor replacement .   Level from this AM at 74 , repeat level daily while in hospital  Dose of 1800 will bring her level to 128% which would be at goal 100-150%, repeat dose on day 2 as well prior to discharge. Continue admit for 48 hr for  and 72 hr for Csection.    Extra dose can be given if pt has post op bleeding.   Give TXA 1 g IV prior to delivery and then 1300 oral TID advised for 14 days   Care d/w obgyn on phone.

## 2023-04-10 NOTE — SUBJECTIVE & OBJECTIVE
Oncology Treatment Plan:   [No matching plan found]    Medications:  Continuous Infusions:   lactated ringers 125 mL/hr at 04/10/23 1229    oxytocin in lactated ringers 20 vilma-units/min (04/10/23 1229)     Scheduled Meds:   antihemophilic factor  1,820 Units Intravenous Once    citalopram  20 mg Oral Daily    ferrous sulfate  1 tablet Oral Daily    oxytocin in lactated ringers  334 vilma-units/min Intravenous Once    oxytocin in lactated ringers  95 vilma-units/min Intravenous Once    pencillin G potassium IVPB  3 Million Units Intravenous Q4H     PRN Meds:azithromycin, calcium carbonate, carboprost, ceFAZolin (ANCEF) IVPB, lactated ringers, lactated ringers, LIDOcaine HCL 10 mg/ml (1%), methylergonovine, miSOPROStoL, miSOPROStoL, ondansetron, oxytocin in lactated ringers, oxytocin in lactated ringers, oxytocin, prochlorperazine, simethicone, tranexamic acid (CYKLOKAPRON) infusion, tranexamic acid (CYKLOKAPRON) infusion     Review of patient's allergies indicates:  No Known Allergies     Past Medical History:   Diagnosis Date    Anxiety     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   Gastrointestinal:  Negative for abdominal pain and blood in stool.   Genitourinary:  Negative for hematuria.   Objective:     Vital Signs (Most Recent):  Temp: 98.2 °F (36.8 °C) (04/10/23 1502)  Pulse: 86 (04/10/23 1631)  Resp: 16 (04/10/23 1200)  BP: 98/63 (04/10/23 1629)  SpO2: 100 % (04/10/23 1631) Vital Signs (24h Range):  Temp:  [98.2 °F (36.8  °C)-98.6 °F (37 °C)] 98.2 °F (36.8 °C)  Pulse:  [] 86  Resp:  [16-18] 16  SpO2:  [96 %-100 %] 100 %  BP: ()/(48-72) 98/63     Weight: 66.2 kg (145 lb 15.1 oz)  Body mass index is 27.58 kg/m².  Body surface area is 1.69 meters squared.      Intake/Output Summary (Last 24 hours) at 4/10/2023 1704  Last data filed at 4/10/2023 1229  Gross per 24 hour   Intake 2135.01 ml   Output --   Net 2135.01 ml       Physical Exam  Constitutional:       General: She is not in acute distress.     Appearance: Normal appearance. She is not ill-appearing.   HENT:      Head: Normocephalic and atraumatic.      Right Ear: External ear normal.      Left Ear: External ear normal.   Eyes:      General: No scleral icterus.        Right eye: No discharge.         Left eye: No discharge.   Cardiovascular:      Rate and Rhythm: Normal rate.   Pulmonary:      Effort: Pulmonary effort is normal. No respiratory distress.   Abdominal:      General: There is distension.   Skin:     Coloration: Skin is not jaundiced.      Findings: No erythema or rash.   Neurological:      Mental Status: She is alert and oriented to person, place, and time. Mental status is at baseline.   Psychiatric:         Mood and Affect: Mood normal.         Speech: Speech normal.         Behavior: Behavior normal.       Significant Labs:   Recent Lab Results         04/10/23  0047        Unit Blood Type Code 0600  [P]       Unit Expiration [id number]  [P]       Unit Blood Type A NEG  [P]       Antibody ID POS  Comment: Rhogam Antibody       Baso # 0.02       Basophil % 0.2       CODING SYSTEM [id number]  [P]       CROSSMATCH INTERPRETATION Compatible  [P]       Differential Method Automated       DISPENSE STATUS CROSSMATCHED  [P]       Eos # 0.1       Eosinophil % 0.8       Factor VIII Activity 74       Gran # (ANC) 6.9       Gran % 68.5       Group & Rh A NEG       Hematocrit 35.0       Hemoglobin 11.7       Immature Grans (Abs) 0.04  Comment: Mild elevation in  immature granulocytes is non specific and   can be seen in a variety of conditions including stress response,   acute inflammation, trauma and pregnancy. Correlation with other   laboratory and clinical findings is essential.         Immature Granulocytes 0.4       INDIRECT ROGELIO POS       Lymph # 2.2       Lymph % 22.3       MCH 31.1       MCHC 33.4       MCV 93       Mono # 0.8       Mono % 7.8       MPV 10.1       nRBC 0       Platelets 169       Product Code [id number]  [P]       RBC 3.76       RDW 13.8       Rh Type NEG       Specimen Outdate 04/13/2023 23:59       UNIT NUMBER [id number]  [P]       WBC 10.00                [P] - Preliminary Result            and All pertinent labs from the last 24 hours have been reviewed.    Diagnostic Results:  None

## 2023-04-10 NOTE — INTERVAL H&P NOTE
Leticia Mcfadden is 28 y.o.  at 39w0d wga presenting for IOL.     FHT: 120 bpm, moderate BTBV, +accels, -decels; Cat 1 (reassuring)  Arimo: q5-6 minutes  Presentation: cephalic by ultrasound    SVE: /-3    1) Induction of Labor  - Plan for cytotec   -  Stark balloon placed without difficulty     2) Rh Neg  - Rho alison w/u pp     3) GBS pos   - PCN per protocol     4) anxiety   - continue home celexa   - 2 week pp mood check     5) Hemophilia A carier   - Avoid FSE/op del   - Epidural only if FVIII> 50  - Heme Onc consult placed  - Avoid NSAIDs  - Del: Hemofil M 1300 u, slow IVP 30-60 mins prior to del, TXA 1gm IV at time of umbilical cord clamping, continue TXA 25 mg/kg PO TID for 5-10 days  - 2nd blue top for cord blood     Contraception: per Dr. Red Hogan MD PGY-1  Obstetrics and Gynecology  [facility]

## 2023-04-10 NOTE — CONSULTS
[facility] - Labor & Delivery  Hematology/Oncology  Consult Note    Patient Name: Leticia Mcfadden  MRN: [id number]  Admission Date: 4/10/2023  Hospital Length of Stay: 0 days  Code Status: Full Code   Attending Provider: Maren Moon MD  Consulting Provider: Sonia Prakash MD  Primary Care Physician: Primary Doctor No  Principal Problem:<principal problem not specified>    Consults  Subjective:     HPI:  28 y.o. F with hemophilia carrier is admitted for induction of labor.     Pt reports to be doing well. She has her mother and  at bedside.     Pt has had wisdom tooth extraction, breast augmentation and DnC (last year) without any factor replacements and had uncomplicated post op course.         Oncology Treatment Plan:   [No matching plan found]    Medications:  Continuous Infusions:   lactated ringers 125 mL/hr at 04/10/23 1229    oxytocin in lactated ringers 20 vilma-units/min (04/10/23 1229)     Scheduled Meds:   antihemophilic factor  1,820 Units Intravenous Once    citalopram  20 mg Oral Daily    ferrous sulfate  1 tablet Oral Daily    oxytocin in lactated ringers  334 vilma-units/min Intravenous Once    oxytocin in lactated ringers  95 vilma-units/min Intravenous Once    pencillin G potassium IVPB  3 Million Units Intravenous Q4H     PRN Meds:azithromycin, calcium carbonate, carboprost, ceFAZolin (ANCEF) IVPB, lactated ringers, lactated ringers, LIDOcaine HCL 10 mg/ml (1%), methylergonovine, miSOPROStoL, miSOPROStoL, ondansetron, oxytocin in lactated ringers, oxytocin in lactated ringers, oxytocin, prochlorperazine, simethicone, tranexamic acid (CYKLOKAPRON) infusion, tranexamic acid (CYKLOKAPRON) infusion     Review of patient's allergies indicates:  No Known Allergies     Past Medical History:   Diagnosis Date    Anxiety     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   Gastrointestinal:  Negative for abdominal pain and blood in stool.   Genitourinary:  Negative for hematuria.   Objective:     Vital Signs (Most Recent):  Temp: 98.2 °F (36.8 °C) (04/10/23 1502)  Pulse: 86 (04/10/23 1631)  Resp: 16 (04/10/23 1200)  BP: 98/63 (04/10/23 1629)  SpO2: 100 % (04/10/23 1631) Vital Signs (24h Range):  Temp:  [98.2 °F (36.8 °C)-98.6 °F (37 °C)] 98.2 °F (36.8 °C)  Pulse:  [] 86  Resp:  [16-18] 16  SpO2:  [96 %-100 %] 100 %  BP: ()/(48-72) 98/63     Weight: 66.2 kg (145 lb 15.1 oz)  Body mass index is 27.58 kg/m².  Body surface area is 1.69 meters squared.      Intake/Output Summary (Last 24 hours) at 4/10/2023 1704  Last data filed at 4/10/2023 1229  Gross per 24 hour   Intake 2135.01 ml   Output --   Net 2135.01 ml       Physical Exam  Constitutional:       General: She is not in acute distress.     Appearance: Normal appearance. She is not ill-appearing.   HENT:      Head: Normocephalic and atraumatic.      Right Ear: External ear normal.      Left Ear: External ear normal.   Eyes:      General: No scleral icterus.        Right eye: No discharge.         Left eye: No discharge.   Cardiovascular:      Rate and Rhythm: Normal rate.   Pulmonary:      Effort: Pulmonary effort is normal. No respiratory distress.   Abdominal:      General: There is distension.   Skin:     Coloration: Skin is not jaundiced.      Findings: No erythema or rash.   Neurological:      Mental Status: She is alert and oriented to person, place, and time. Mental status is at baseline.   Psychiatric:         Mood and Affect: Mood normal.          Speech: Speech normal.         Behavior: Behavior normal.       Significant Labs:   Recent Lab Results         04/10/23  0047        Unit Blood Type Code 0600  [P]       Unit Expiration [id number]  [P]       Unit Blood Type A NEG  [P]       Antibody ID POS  Comment: Rhogam Antibody       Baso # 0.02       Basophil % 0.2       CODING SYSTEM [id number]  [P]       CROSSMATCH INTERPRETATION Compatible  [P]       Differential Method Automated       DISPENSE STATUS CROSSMATCHED  [P]       Eos # 0.1       Eosinophil % 0.8       Factor VIII Activity 74       Gran # (ANC) 6.9       Gran % 68.5       Group & Rh A NEG       Hematocrit 35.0       Hemoglobin 11.7       Immature Grans (Abs) 0.04  Comment: Mild elevation in immature granulocytes is non specific and   can be seen in a variety of conditions including stress response,   acute inflammation, trauma and pregnancy. Correlation with other   laboratory and clinical findings is essential.         Immature Granulocytes 0.4       INDIRECT ROGELIO POS       Lymph # 2.2       Lymph % 22.3       MCH 31.1       MCHC 33.4       MCV 93       Mono # 0.8       Mono % 7.8       MPV 10.1       nRBC 0       Platelets 169       Product Code [id number]  [P]       RBC 3.76       RDW 13.8       Rh Type NEG       Specimen Outdate 2023 23:59       UNIT NUMBER [id number]  [P]       WBC 10.00                [P] - Preliminary Result            and All pertinent labs from the last 24 hours have been reviewed.    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   Level from this AM at 74 , repeat level daily while in hospital  Dose of 1800 will bring her level to 128% which would be at goal 100-150%, repeat dose on day 2 as well prior to discharge. Continue admit for 48 hr for   and 72 hr for Csection.    Extra dose can be given if pt has post op bleeding.   Give TXA 1 g IV prior to delivery and then 1300 oral TID advised for 14 days   Care d/w obgyn on phone.         Rest management by ob     Thank you for your consult. I will follow-up with patient. Please contact us if you have any additional questions.    Sonia Prakash MD  Hematology/Oncology  [facility] - Labor & Delivery

## 2023-04-10 NOTE — PROGRESS NOTES
"LABOR NOTE    Resident to bedside for cervical check due to early decels.    S:  Complaints: No.  Epidural working:  yes      O: BP (!) 89/54   Pulse 71   Temp 98.6 °F (37 °C)   Resp 16   Ht 5' 1" (1.549 m)   Wt 66.2 kg (145 lb 15.1 oz)   LMP 2022   SpO2 100%   Breastfeeding No   BMI 27.58 kg/m²       FHT: Cat 1 (reassuring), baseline 125, mod BTBV, + accels, + early decels  CTX: q 2-3 minutes  SVE:       ASSESSMENT:   28 y.o.  at 39w0d, IOL    FHT reassuring    There are no hospital problems to display for this patient.      TIMELINE:  0155: 1/70/-3   0845: 5/70/-3  1000: , AROM clr  1200: , pit @16 mU/min    PLAN:  Continue Close Maternal/Fetal Monitoring  Pitocin Augmentation per protocol  Recheck 2-4 hours or PRN    Chidi Pierre M.D.  OB/GYN PGY-3    "

## 2023-04-10 NOTE — H&P (VIEW-ONLY)
HISTORY AND PHYSICAL                                                OBSTETRICS          Subjective:      Leticia Mcfadden is a 28 y.o.  female with IUP at 39w0d weeks gestation who presents to L&D for induction of labor. Pertinent medical history for this pregnancy includes hemophilia carrier followed by [facility], anxiety stable on Celexa, and history of miscarriage x 2.  She denies contractions, vaginal bleeding, leakage of fluid.  Reports normal fetal movement. Care this pregnancy has been with Dr. 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 90 tablet 0    ferrous sulfate 325 (65 FE) MG EC tablet Take 1 tablet (325 mg total) by mouth once daily. 30 tablet 1    nystatin (MYCOSTATIN) powder Apply to affected area 3 times daily 60 g 0    prenatal 25/iron fum/folic/dha (PRENATAL-1 ORAL) Take by mouth.      tranexamic acid (LYSTEDA) 650 mg tablet Take 2 tablets (1,300 mg total) by mouth 3 (three) times daily for 14 days 84 tablet 0     No current facility-administered medications on file prior to visit.       SH:   Social History     Socioeconomic History    Marital status:    Tobacco Use    Smoking status: Never    Smokeless tobacco: Never   Substance and Sexual Activity    Alcohol use: Never    Drug use: Never    Sexual activity: Yes     Partners: Male     Birth control/protection: None       FH:   Family History   Problem Relation Age of Onset    Diabetes Father        OBHx:   OB History    Para Term  AB Living   3 0 0 0 2 0    SAB IAB Ectopic Multiple Live Births   2 0 0 0 0      # Outcome Date GA Lbr Cliff/2nd Weight Sex Delivery Anes PTL Lv   3 Current            2 SAB- Conjoined twins 22 6w0d          1 SAB                GYNHx: Age of Menarche:14  No abnormal pap or STDs    Objective:     Wt Readings from Last 3 Encounters:   23 66.2 kg (145 lb 15.1 oz)   23 64.2 kg (141 lb 8.6 oz)   23 64 kg (140 lb 15.8 oz)     Temp Readings from Last 3 Encounters:   23 97.8 °F (36.6 °C) (Oral)     BP Readings from Last 3 Encounters:   23 110/68   23 107/61   23 98/66         General:   alert and cooperative   HEENT:  normocephalic, atraumatic   Lungs:   clear to auscultation bilaterally   Heart:   regular rate and rhythm, S1, S2 normal   Abdomen:  gravid, non-tender   Extremities non-tender, no edema   Derm: no rashes or lesions   Psych: appropriate mood and affect   Pelvis:  adequate    SVE: /-3  Presentation: vertex   FHT: 138 bpm       Lab Review  Blood Type A NEG  GBBS: positive   Rubella:   Lab Results   Component Value Date    RUBELLAIGGAN 15.4 2022    immune  RPR:   RPR   Date Value Ref Range Status   2023 Non-reactive Non-reactive Final      HIV:   Lab Results   Component Value Date    JRC33CTVY Non-reactive 2023     HepB:   Hepatitis B Surface Ag   Date Value Ref Range Status   2022 Non-reactive Non-reactive Final        Assessment:     28 y.o.  at 39w0d weeks gestation here for induction of labor   Plan:        1. Risks, benefits, alternatives and possible complications have been discussed in detail with the patient. All questions have been answered, and Ms. Mcfadden has voiced understanding and agrees to the treatment plan.  2. Consents signed and in chart.  3. Admit to Labor and Delivery unit for induction of labor.  4. 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 Consult hematology upon admit.  5. GBS positive: Plan  PCN in labor  6. Anxiety: stable on Celexa. Plan 1-2 week pp mood check      MD Maren Moncada MD

## 2023-04-10 NOTE — HPI
28 y.o. F with hemophilia carrier is admitted for induction of labor.     Pt reports to be doing well. She has her mother and  at bedside.     Pt has had wisdom tooth extraction, breast augmentation and DnC (last year) without any factor replacements and had uncomplicated post op course.

## 2023-04-10 NOTE — PROGRESS NOTES
"LABOR NOTE    Resident to bedside for routine cervical check.    S:  Complaints: No.  Epidural working:  yes      O: /61   Pulse 83   Temp 98.3 °F (36.8 °C)   Resp 16   Ht 5' 1" (1.549 m)   Wt 66.2 kg (145 lb 15.1 oz)   LMP 2022   SpO2 96%   Breastfeeding No   BMI 27.58 kg/m²       FHT: Cat 1 (reassuring), baseline 125, mod BTBV, + accels, - decels  CTX: q 2-3 minutes  SVE:       ASSESSMENT:   28 y.o.  at 39w0d, IOL    FHT reassuring    There are no hospital problems to display for this patient.      TIMELINE:  0155: 1/70/-3   0845: 5/70/-3  1000: , AROM clr    PLAN:  Continue Close Maternal/Fetal Monitoring  Pitocin Augmentation per protocol  Recheck 2-4 hours or PRN    Chidi Pierre M.D.  OB/GYN PGY-3    "

## 2023-04-10 NOTE — PROGRESS NOTES
"LABOR NOTE    Resident to bedside for routine cervical check.    S:  Complaints: No.  Epidural working:  yes      O: BP (!) 103/57   Pulse 74   Temp 98.2 °F (36.8 °C)   Resp 16   Ht 5' 1" (1.549 m)   Wt 66.2 kg (145 lb 15.1 oz)   LMP 2022   SpO2 100%   Breastfeeding No   BMI 27.58 kg/m²       FHT: Cat 1 (reassuring), baseline 135, mod juanito, + accels, + early decels  CTX: q 2-3 minutes  SVE:       ASSESSMENT:   28 y.o.  at 39w0d, IOL    FHT reassuring    TIMELINE:  0155: 3   0845: 3  1000: -2, AROM clr  1200: 2, pit @16 mU/min  1600: , pit at 22    PLAN:  Continue Close Maternal/Fetal Monitoring  Pitocin Augmentation per protocol  Recheck 2-4 hours or PRN      Jyotsna Miller MD   OB/GYN PGY1  [facility]    "

## 2023-04-10 NOTE — ANESTHESIA PREPROCEDURE EVALUATION
[facility]  Anesthesia Pre-Operative Evaluation         Patient Name: Letiica Mcfadden  YOB: 1995  MRN: [id number]    04/10/2023      Leticia Mcfadden is a 28 y.o. female  @ 39w0d who presents  L&D for induction of labor. She has a history of carrier of hemophilia A, anxiety, and miscarriage x 2. She denies history of HTN, DM, asthma, or complications with anesthesia.     Recent FVIII Assay Levels:  3/14/2023 74%  2023 50%  3/18/2022 29%    I had an open discussion with her about risk versus benefit of neuraxial anesthesia in the setting of hemophilia A carrier. I explained to her the risks of epidural placement including increased bleeding, epidural hematoma, and paralysis. I also explained the difficulty in discerning symptoms of epidural hematoma versus a therapeutic epidural which can delay diagnosis of the complication. She voiced understanding of the risks.     Per hematology recommendations, plans to administer Hemofil M 1300u prior to delivery and recheck FVIII at 24 and 72 hours with goal of FVIII 100-150%.      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, asymptomatic    Nasal congestion    Fever       Past Surgical History:   Procedure Laterality Date    AUGMENTATION OF BREAST      DILATION AND CURETTAGE OF UTERUS      WISDOM TOOTH EXTRACTION         Social History     Socioeconomic History    Marital status:    Tobacco Use    Smoking  status: Never    Smokeless tobacco: Never   Substance and Sexual Activity    Alcohol use: Never    Drug use: Never    Sexual activity: Yes     Partners: Male     Birth control/protection: None         Chemistry    No results found for: NA, K, CL, CO2, BUN, CREATININE, GLU No results found for: CALCIUM, ALKPHOS, AST, ALT, BILITOT, ESTGFRAFRICA, EGFRNONAA         Lab Results   Component Value Date    WBC 10.19 03/14/2023    HGB 11.3 (L) 03/14/2023    HCT 35.7 (L) 03/14/2023    MCV 97 03/14/2023     03/14/2023       No results for input(s): PT, INR, PROTIME, APTT in the last 72 hours.          Pre-op Assessment    I have reviewed the Patient Summary Reports.       I have reviewed the Medications.     Review of Systems  Anesthesia Hx:  No problems with previous Anesthesia  Denies Family Hx of Anesthesia complications.   Denies Personal Hx of Anesthesia complications.   Hematology/Oncology:        Hematology Comments: Hemophilia A carrier   Cardiovascular:  Cardiovascular Normal     Pulmonary:  Pulmonary Normal    Renal/:  Renal/ Normal     Hepatic/GI:  Hepatic/GI Normal    Endocrine:  Endocrine Normal    Psych:   anxiety          Physical Exam  General: Well nourished, Cooperative, Alert and Oriented    Airway:  Mallampati: III / II  Mouth Opening: Normal  TM Distance: Normal  Tongue: Normal  Neck ROM: Normal ROM    Dental:  Intact    Chest/Lungs:  Clear to auscultation, Normal Respiratory Rate    Heart:  Rate: Normal  Rhythm: Regular Rhythm        Anesthesia Plan  Type of Anesthesia, risks & benefits discussed:    Anesthesia Type: Gen ETT, Epidural, Spinal  Intra-op Monitoring Plan: Standard ASA Monitors  Post Op Pain Control Plan: epidural analgesia, intrathecal opioid and multimodal analgesia  Induction:  IV  Airway Plan: Direct and Video, Post-Induction  Informed Consent: Informed consent signed with the Patient and all parties understand the risks and agree with anesthesia plan.  All questions  answered. Patient consented to blood products? Yes  ASA Score: 2  Day of Surgery Review of History & Physical: H&P Update referred to the surgeon/provider.    Ready For Surgery From Anesthesia Perspective.     .

## 2023-04-11 LAB
BASOPHILS # BLD AUTO: 0.04 K/UL (ref 0–0.2)
BASOPHILS NFR BLD: 0.3 % (ref 0–1.9)
BLD PROD TYP BPU: NORMAL
BLOOD UNIT EXPIRATION DATE: NORMAL
BLOOD UNIT TYPE CODE: 600
BLOOD UNIT TYPE: NORMAL
CODING SYSTEM: NORMAL
CROSSMATCH INTERPRETATION: NORMAL
DIFFERENTIAL METHOD: ABNORMAL
DISPENSE STATUS: NORMAL
EOSINOPHIL # BLD AUTO: 0.1 K/UL (ref 0–0.5)
EOSINOPHIL NFR BLD: 0.5 % (ref 0–8)
ERYTHROCYTE [DISTWIDTH] IN BLOOD BY AUTOMATED COUNT: 13.9 % (ref 11.5–14.5)
HCT VFR BLD AUTO: 31.8 % (ref 37–48.5)
HGB BLD-MCNC: 10.6 G/DL (ref 12–16)
IMM GRANULOCYTES # BLD AUTO: 0.09 K/UL (ref 0–0.04)
IMM GRANULOCYTES NFR BLD AUTO: 0.6 % (ref 0–0.5)
LYMPHOCYTES # BLD AUTO: 1.9 K/UL (ref 1–4.8)
LYMPHOCYTES NFR BLD: 12.1 % (ref 18–48)
MCH RBC QN AUTO: 31.2 PG (ref 27–31)
MCHC RBC AUTO-ENTMCNC: 33.3 G/DL (ref 32–36)
MCV RBC AUTO: 94 FL (ref 82–98)
MONOCYTES # BLD AUTO: 1.1 K/UL (ref 0.3–1)
MONOCYTES NFR BLD: 7 % (ref 4–15)
NEUTROPHILS # BLD AUTO: 12.7 K/UL (ref 1.8–7.7)
NEUTROPHILS NFR BLD: 79.5 % (ref 38–73)
NRBC BLD-RTO: 0 /100 WBC
NUM UNITS TRANS PACKED RBC: NORMAL
PLATELET # BLD AUTO: 144 K/UL (ref 150–450)
PMV BLD AUTO: 10.2 FL (ref 9.2–12.9)
RBC # BLD AUTO: 3.4 M/UL (ref 4–5.4)
WBC # BLD AUTO: 15.92 K/UL (ref 3.9–12.7)

## 2023-04-11 PROCEDURE — 25000003 PHARM REV CODE 250

## 2023-04-11 PROCEDURE — 72200005 HC VAGINAL DELIVERY LEVEL II

## 2023-04-11 PROCEDURE — 11000001 HC ACUTE MED/SURG PRIVATE ROOM

## 2023-04-11 PROCEDURE — 99024 PR POST-OP FOLLOW-UP VISIT: ICD-10-PCS | Mod: ,,, | Performed by: OBSTETRICS & GYNECOLOGY

## 2023-04-11 PROCEDURE — 72100003 HC LABOR CARE, EA. ADDL. 8 HRS

## 2023-04-11 PROCEDURE — 85025 COMPLETE CBC W/AUTO DIFF WBC: CPT | Performed by: OBSTETRICS & GYNECOLOGY

## 2023-04-11 PROCEDURE — 36415 COLL VENOUS BLD VENIPUNCTURE: CPT | Performed by: OBSTETRICS & GYNECOLOGY

## 2023-04-11 PROCEDURE — 99024 POSTOP FOLLOW-UP VISIT: CPT | Mod: ,,, | Performed by: OBSTETRICS & GYNECOLOGY

## 2023-04-11 PROCEDURE — 25000003 PHARM REV CODE 250: Performed by: OBSTETRICS & GYNECOLOGY

## 2023-04-11 PROCEDURE — 72100002 HC LABOR CARE, 1ST 8 HOURS

## 2023-04-11 PROCEDURE — 85240 CLOT FACTOR VIII AHG 1 STAGE: CPT | Performed by: OBSTETRICS & GYNECOLOGY

## 2023-04-11 RX ORDER — OXYTOCIN/RINGER'S LACTATE 30/500 ML
95 PLASTIC BAG, INJECTION (ML) INTRAVENOUS ONCE
Status: DISCONTINUED | OUTPATIENT
Start: 2023-04-11 | End: 2023-04-12 | Stop reason: HOSPADM

## 2023-04-11 RX ORDER — ACETAMINOPHEN 325 MG/1
650 TABLET ORAL EVERY 6 HOURS PRN
Status: DISCONTINUED | OUTPATIENT
Start: 2023-04-11 | End: 2023-04-12 | Stop reason: HOSPADM

## 2023-04-11 RX ORDER — HYDROCORTISONE 25 MG/G
CREAM TOPICAL 3 TIMES DAILY PRN
Status: DISCONTINUED | OUTPATIENT
Start: 2023-04-11 | End: 2023-04-12 | Stop reason: HOSPADM

## 2023-04-11 RX ORDER — TRANEXAMIC ACID 650 MG/1
1300 TABLET ORAL 3 TIMES DAILY
Status: DISCONTINUED | OUTPATIENT
Start: 2023-04-11 | End: 2023-04-12 | Stop reason: HOSPADM

## 2023-04-11 RX ORDER — METHYLERGONOVINE MALEATE 0.2 MG/ML
200 INJECTION INTRAVENOUS
Status: DISCONTINUED | OUTPATIENT
Start: 2023-04-11 | End: 2023-04-12 | Stop reason: HOSPADM

## 2023-04-11 RX ORDER — MISOPROSTOL 200 UG/1
800 TABLET ORAL
Status: DISCONTINUED | OUTPATIENT
Start: 2023-04-11 | End: 2023-04-12 | Stop reason: HOSPADM

## 2023-04-11 RX ORDER — DOCUSATE SODIUM 100 MG/1
200 CAPSULE, LIQUID FILLED ORAL 2 TIMES DAILY PRN
Status: DISCONTINUED | OUTPATIENT
Start: 2023-04-11 | End: 2023-04-12 | Stop reason: HOSPADM

## 2023-04-11 RX ORDER — PRENATAL WITH FERROUS FUM AND FOLIC ACID 3080; 920; 120; 400; 22; 1.84; 3; 20; 10; 1; 12; 200; 27; 25; 2 [IU]/1; [IU]/1; MG/1; [IU]/1; MG/1; MG/1; MG/1; MG/1; MG/1; MG/1; UG/1; MG/1; MG/1; MG/1; MG/1
1 TABLET ORAL DAILY
Status: DISCONTINUED | OUTPATIENT
Start: 2023-04-11 | End: 2023-04-12 | Stop reason: HOSPADM

## 2023-04-11 RX ORDER — CARBOPROST TROMETHAMINE 250 UG/ML
250 INJECTION, SOLUTION INTRAMUSCULAR
Status: DISCONTINUED | OUTPATIENT
Start: 2023-04-11 | End: 2023-04-12 | Stop reason: HOSPADM

## 2023-04-11 RX ORDER — SIMETHICONE 80 MG
1 TABLET,CHEWABLE ORAL EVERY 6 HOURS PRN
Status: DISCONTINUED | OUTPATIENT
Start: 2023-04-11 | End: 2023-04-12 | Stop reason: HOSPADM

## 2023-04-11 RX ORDER — HYDROCODONE BITARTRATE AND ACETAMINOPHEN 10; 325 MG/1; MG/1
1 TABLET ORAL EVERY 4 HOURS PRN
Status: DISCONTINUED | OUTPATIENT
Start: 2023-04-11 | End: 2023-04-12 | Stop reason: HOSPADM

## 2023-04-11 RX ORDER — HYDROCODONE BITARTRATE AND ACETAMINOPHEN 5; 325 MG/1; MG/1
1 TABLET ORAL EVERY 4 HOURS PRN
Status: DISCONTINUED | OUTPATIENT
Start: 2023-04-11 | End: 2023-04-12 | Stop reason: HOSPADM

## 2023-04-11 RX ORDER — DIPHENHYDRAMINE HYDROCHLORIDE 50 MG/ML
25 INJECTION INTRAMUSCULAR; INTRAVENOUS EVERY 4 HOURS PRN
Status: DISCONTINUED | OUTPATIENT
Start: 2023-04-11 | End: 2023-04-12 | Stop reason: HOSPADM

## 2023-04-11 RX ORDER — DIPHENHYDRAMINE HCL 25 MG
25 CAPSULE ORAL EVERY 4 HOURS PRN
Status: DISCONTINUED | OUTPATIENT
Start: 2023-04-11 | End: 2023-04-12 | Stop reason: HOSPADM

## 2023-04-11 RX ORDER — IBUPROFEN 600 MG/1
600 TABLET ORAL EVERY 6 HOURS
Status: DISCONTINUED | OUTPATIENT
Start: 2023-04-11 | End: 2023-04-12 | Stop reason: HOSPADM

## 2023-04-11 RX ADMIN — FERROUS SULFATE TAB 325 MG (65 MG ELEMENTAL FE) 1 EACH: 325 (65 FE) TAB at 08:04

## 2023-04-11 RX ADMIN — IBUPROFEN 600 MG: 600 TABLET, FILM COATED ORAL at 08:04

## 2023-04-11 RX ADMIN — PRENATAL VIT W/ FE FUMARATE-FA TAB 27-0.8 MG 1 TABLET: 27-0.8 TAB at 08:04

## 2023-04-11 RX ADMIN — IBUPROFEN 600 MG: 600 TABLET, FILM COATED ORAL at 06:04

## 2023-04-11 RX ADMIN — DOCUSATE SODIUM 200 MG: 100 CAPSULE, LIQUID FILLED ORAL at 08:04

## 2023-04-11 RX ADMIN — CITALOPRAM HYDROBROMIDE 20 MG: 20 TABLET ORAL at 08:04

## 2023-04-11 RX ADMIN — IBUPROFEN 600 MG: 600 TABLET, FILM COATED ORAL at 01:04

## 2023-04-11 RX ADMIN — TRANEXAMIC ACID 1300 MG: 650 TABLET ORAL at 04:04

## 2023-04-11 RX ADMIN — TRANEXAMIC ACID 1300 MG: 650 TABLET ORAL at 11:04

## 2023-04-11 RX ADMIN — TRANEXAMIC ACID 1300 MG: 650 TABLET ORAL at 08:04

## 2023-04-11 NOTE — ANESTHESIA POSTPROCEDURE EVALUATION
Anesthesia Post Evaluation    Patient: Leticia Mcfadden    Procedure(s) Performed: * No procedures listed *    Final Anesthesia Type: epidural      Patient location during evaluation: med/surg floor  Patient participation: Yes- Able to Participate  Level of consciousness: awake and alert  Post-procedure vital signs: reviewed and stable  Pain management: adequate  Airway patency: patent  GWENDOLYN mitigation strategies: Multimodal analgesia and Use of major conduction anesthesia (spinal/epidural) or peripheral nerve block  PONV status at discharge: No PONV                Vitals Value Taken Time   BP 98/53 04/11/23 0838   Temp 36.6 °C (97.9 °F) 04/11/23 0838   Pulse 84 04/11/23 0838   Resp 17 04/11/23 0838   SpO2 98 % 04/11/23 0838         No case tracking events are documented in the log.      Pain/Lola Score: Pain Rating Prior to Med Admin: 2 (4/11/2023  6:38 AM)

## 2023-04-11 NOTE — PLAN OF CARE
Patient will attempt to latch x 10-15 min, pump and/or hand express and supplement with EBM/DM until content via slow flow nipple

## 2023-04-11 NOTE — LACTATION NOTE
04/11/23 1230   Maternal Assessment   Breast Shape Bilateral:;round   Breast Density Bilateral:;soft   Areola Bilateral:;dense   Nipples Bilateral:;flat   Maternal Infant Feeding   Maternal Emotional State assist needed   Infant Positioning clutch/football   Latch Assistance yes  (assiatnce given; unable to achieve latch)   Breast Pumping   Breast Pumping hand expression utilized   Community Referrals   Community Referrals outpatient lactation program;pediatric care provider     Assisted mother with feeding session. Baby positioned skin to skin in football. To L breast. Mother is wearing shells for flat nipples. Baby is unable to achieve latch- will not open mouth wide when rooting, sucks lips/tongue and thrusts tongue. Oral anatomy assessed with gloved finger. Baby bites down and gums finger. Suck is disorganized and tongue is bunched. Lingual frenum appears tethered. Mother able to hand express colostrum. Spoon fed a few mls but baby has difficulty with this method of feeding. Demonstrated paced bottle feeding with slow nipple. Baby fed better with this method but did spit back a couple of mls. Discussed plan for feedings: attempt to latch x10-15 min, pump and/or hand express and feed baby any expressed colostrum until content. Spoke with Dr Valles, pediatric provider, about baby's feeding. Plan is to order SLP consult and order for donor milk if needed. LC number on the board. Patient to call at next feeding for assistance and to initiate pump.

## 2023-04-11 NOTE — L&D DELIVERY NOTE
"[facility] - Labor & Delivery  Vaginal Delivery   Operative Note    SUMMARY     Normal spontaneous vaginal delivery of live infant after approximately 15 minutes of maternal pushing effort. Infant delivered in OA presentation. No nuchal cord was noted with delivery.  Infant was placed on mothers abdomen for skin to skin and bulb suctioning performed. Delayed cord clamping was performed. Cord was cut and clamped and cord blood was collected.    Spontaneous delivery of placenta with gentle traction applied. IV pitocin was given noting uterine atony with bleeding. 800mg cytotec VA given with good uterine tone and cessation of bleeding. No trailing membranes were noted on bimanual examination. Placenta was inspected and noted to be intact.    Bilateral periurethral and bilateral sidewall lacerations repaired in normal fashion     Patient tolerated delivery well. Sponge, needle, and lap counted correctly x2.     EBL 300cc.      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, asymptomatic    Nasal congestion    Fever     (spontaneous vaginal delivery)     Admitting GA: 39w0d    Delivery Information for Venkata Mcfadden    Birth information:  YOB: 2022   Time of birth: 6:35 PM   Sex: male   Head Delivery Date/Time: 4/10/2023  6:35 PM   Delivery type: Vaginal, Spontaneous   Gestational Age: 39w0d  Unknown    Delivery Providers    Delivering clinician: Alberta Camacho MD   Provider Role    Debbi Hogan MD Resident    Daina Arreola RN Registered Nurse    Jyotsna Martinez RN Registered Nurse    Kayla Brooks RN Registered Nurse    Columba Cuevas RN Registered Nurse              Measurements    Weight: 2820 g  Weight (lbs): 6 lb 3.5 oz  Length: 48.9 cm  Length (in): 19.25"  Head circumference: 35.5 cm  Chest " circumference: 31.1 cm         Apgars    Living status: Living  Apgars:  1 min.:  5 min.:  10 min.:  15 min.:  20 min.:    Skin color:  0  1       Heart rate:  2  2       Reflex irritability:  2  2       Muscle tone:  2  2       Respiratory effort:  2  2       Total:  8  9       Apgars assigned by: ARPITA DING RN         Operative Delivery    Forceps attempted?: No  Vacuum extractor attempted?: No         Shoulder Dystocia    Shoulder dystocia present?: No           Presentation    Presentation: Vertex  Position: Right Occiput Anterior           Interventions/Resuscitation    Method: Tactile Stimulation, Bulb Suctioning       Cord    Vessels: 3 vessels  Complications: Nuchal  Nuchal Intervention: reduced  Nuchal Cord Description: loose nuchal cord  Number of Loops: 2  Delayed Cord Clamping?: Yes  Cord Clamped Date/Time: 4/10/2023  6:36 PM  Cord Blood Disposition: Sent with Baby, Lab  Gases Sent?: No  Stem Cell Collection (by MD): No       Placenta    Placenta delivery date/time: 4/10/2023 1840  Placenta removal: Spontaneous  Placenta appearance: Intact  Placenta disposition: Discarded           Labor Events:       labor: No     Labor Onset Date/Time: 04/10/2023       Dilation Complete Date/Time: 04/10/2023 17:28     Start Pushing Date/Time: 04/10/2023 18:09       Start Pushing Date/Time: 04/10/2023 18:09     Rupture Date/Time: 04/10/23  1007           Rupture type: ARM (Artificial Rupture)           Fluid Amount:         Fluid Color: Clear                 steroids: None     Antibiotics given for GBS: Yes     Induction: balloon dilation (Stark);misoprostol     Indications for induction:        Augmentation: oxytocin;amniotomy     Indications for augmentation:       Labor complications: None     Additional complications:          Cervical ripening:          Stark/EASI          Delivery:      Episiotomy: None     Indication for Episiotomy:       Perineal Lacerations:   Repaired:      Periurethral  Laceration: bilateral Repaired: Yes   Labial Laceration:   Repaired:     Sulcus Laceration:   Repaired:     Vaginal Laceration: Yes Repaired: Yes   Cervical Laceration:   Repaired:     Repair suture:       Repair # of packets: 2     Last Value - EBL - Nursing (mL):       Sum - EBL - Nursing (mL): 0     Last Value - EBL - Anesthesia (mL):        Calculated QBL (mL): 298 mL         Vaginal Sweep Performed:       Surgicount Correct: Yes     Vaginal Packing: No Quantity:       Other providers:       Anesthesia    Method: Epidural          Details (if applicable):  Trial of Labor      Categorization:      Priority:     Indications for :     Incision Type:       Additional  information:  Forceps:    Vacuum:    Breech:    Observed anomalies    Other (Comments):

## 2023-04-11 NOTE — PROGRESS NOTES
POSTPARTUM PROGRESS NOTE    Subjective:     PPD#: 1   Procedure:    EGA: 39w0d   N/V: No   F/C: No   Abd Pain: Mild, well-controlled with oral pain medication   Lochia: Moderate, soaking <1 pad/hr   Voiding: Yes   Ambulating: Yes   Bowel fnc: Yes   Breastfeeding: Yes- hand expression    Contraception: Per primary OB   Circumcision: Declined by OB attending due to maternal hemophelia carrier status     Objective:      Temp:  [98.2 °F (36.8 °C)-98.9 °F (37.2 °C)] 98.3 °F (36.8 °C)  Pulse:  [] 90  Resp:  [16-18] 18  SpO2:  [91 %-100 %] 98 %  BP: ()/(48-84) 98/53    Lung: Normal respiratory effort   Abdomen: Soft, appropriately tender   Uterus: Firm, no fundal tenderness       : Deferred   Extremities: Bilateral trace edema     Lab Review    No results for input(s): NA, K, CL, CO2, BUN, CREATININE, GLU, PROT, BILITOT, ALKPHOS, ALT, AST, MG, PHOS in the last 168 hours.    Recent Labs   Lab 04/10/23  0047 23  0712   WBC 10.00 15.92*   HGB 11.7* 10.6*   HCT 35.0* 31.8*   MCV 93 94    144*         I/O    Intake/Output Summary (Last 24 hours) at 2023 0751  Last data filed at 2023 0600  Gross per 24 hour   Intake 1253.2 ml   Output 2448 ml   Net -1194.8 ml        Assessment and Plan:   Leticia Mcfadden is a 28 y.o. yo  s/p  PPD #1 with maternal hemophelia A carrier status, Rh neg     Hemophelia A carrier  -s/p pre-delivery TXA  -Starting TXA 1300mg PO TID x 14 days postpartum today  -Factor VIII check at 24h postpartum (1830 tonight) pending  -Predelivery H&H .35--> --> PPD#1 H&H 10.6/31.8  -Lochia WNL  -No symptoms of anemia  -Cont to monitor  -Testing for baby carrier status pending, no circ    Rh neg  -Baby also neg, rhogam not indicated    Postpartum care  -Patient doing well and meeting appropriate milestones  -Pain controlled  -Ambulating, voiding, maria de jesus PO  -Hand expressing w/o issues, to work with lactation today  -Baby at bedside, doing well  -Continue routine  postpartum care       Priscilla Ordonez MD  OB Hospitalist   Date and Time: 04/11/2023 7:51 AM

## 2023-04-11 NOTE — PLAN OF CARE
Pt ambulating and voiding without difficulty. Patient safety maintained, side rails up, bed low and locked position.  Pain well controlled with scheduled pain medication. Fundus midline, firm, with scant lochia. VSS. Significant other at bedside; parents responding to infant cues and bonding appropriately. Will continue to monitor and intervene as necessary        Problem: Adult Inpatient Plan of Care  Goal: Plan of Care Review  Outcome: Ongoing, Progressing  Goal: Patient-Specific Goal (Individualized)  Outcome: Ongoing, Progressing  Goal: Absence of Hospital-Acquired Illness or Injury  Outcome: Ongoing, Progressing  Goal: Optimal Comfort and Wellbeing  Outcome: Ongoing, Progressing  Goal: Readiness for Transition of Care  Outcome: Ongoing, Progressing   .

## 2023-04-11 NOTE — LACTATION NOTE
04/11/23 1530   Maternal Assessment   Breast Shape Bilateral:;round   Breast Density Bilateral:;soft   Areola Bilateral:;dense   Nipples Bilateral:;flat   Maternal Infant Feeding   Maternal Emotional State assist needed   Infant Positioning clutch/football   Latch Assistance yes  (unable to achieve latch)   Equipment Type   Breast Pump Type double electric, hospital grade   Breast Pump Flange Type hard   Breast Pump Flange Size 24 mm   Breast Pumping   Breast Pumping Interventions post-feed pumping encouraged   Breast Pumping hand expression utilized     Assisted with positioning and latch to R breast. Shells are assisting with elongating nipple more but telescopes and flattens after several minutes of attempts. Baby continues to thrust tongue but roots and opened mouth more with this attempted latch. Attempts at latch but unable to sustain or achieve a latch. Gennius pump, tubing, collections containers brought to bedside.  Discussed proper pump setting of initiation phase.  Instructed on proper usage of pump and to adjust suction according to maximum comfort level.  Using 24 mm flanges but may need 21 mm with next session. Brought to bedside for use at next session.  Educated on the frequency and duration of pumping in order to promote and maintain a full milk supply.  Hands on pumping technique reviewed.  Encouraged hand expression after pumping.  Demonstrated and instructed on cleaning of breast pump parts. Observed mother attempting to nipple baby EBM. Baby thrusts tongue. LC nippled baby 7 ml slowly. Baby needs chin support. Mother able to nipple another 3 ml after LC demonstrated. Report given to WATSON Ch RN on baby's feedings. Pt verbalized understanding and appropriate recall for proper milk handling, collection, labeling, and storage of EBM.

## 2023-04-12 ENCOUNTER — PATIENT MESSAGE (OUTPATIENT)
Dept: LACTATION | Facility: CLINIC | Age: 28
End: 2023-04-12
Payer: COMMERCIAL

## 2023-04-12 VITALS
WEIGHT: 145.94 LBS | HEART RATE: 78 BPM | OXYGEN SATURATION: 98 % | TEMPERATURE: 98 F | BODY MASS INDEX: 27.55 KG/M2 | DIASTOLIC BLOOD PRESSURE: 51 MMHG | RESPIRATION RATE: 18 BRPM | HEIGHT: 61 IN | SYSTOLIC BLOOD PRESSURE: 90 MMHG

## 2023-04-12 DIAGNOSIS — Z14.01 HEMOPHILIA A CARRIER, ASYMPTOMATIC: Primary | ICD-10-CM

## 2023-04-12 LAB — FACT VIII ACT/NOR PPP: 64 % (ref 60–170)

## 2023-04-12 PROCEDURE — 25000003 PHARM REV CODE 250

## 2023-04-12 PROCEDURE — 25000003 PHARM REV CODE 250: Performed by: OBSTETRICS & GYNECOLOGY

## 2023-04-12 PROCEDURE — 63600531 PHARM REV CODE 636 NO ALT 250 W HCPCS: Mod: JZ,JG | Performed by: OBSTETRICS & GYNECOLOGY

## 2023-04-12 RX ORDER — IBUPROFEN 600 MG/1
600 TABLET ORAL EVERY 6 HOURS
Qty: 60 TABLET | Refills: 1 | Status: SHIPPED | OUTPATIENT
Start: 2023-04-12 | End: 2023-07-03

## 2023-04-12 RX ORDER — DOCUSATE SODIUM 100 MG/1
100 CAPSULE, LIQUID FILLED ORAL 2 TIMES DAILY PRN
Qty: 60 CAPSULE | Refills: 0 | Status: SHIPPED | OUTPATIENT
Start: 2023-04-12 | End: 2023-07-03

## 2023-04-12 RX ADMIN — IBUPROFEN 600 MG: 600 TABLET, FILM COATED ORAL at 08:04

## 2023-04-12 RX ADMIN — DOCUSATE SODIUM 200 MG: 100 CAPSULE, LIQUID FILLED ORAL at 08:04

## 2023-04-12 RX ADMIN — IBUPROFEN 600 MG: 600 TABLET, FILM COATED ORAL at 01:04

## 2023-04-12 RX ADMIN — ANTIHEMOPHILIC FACTOR HUMAN 1820 UNITS: KIT at 12:04

## 2023-04-12 RX ADMIN — PRENATAL VIT W/ FE FUMARATE-FA TAB 27-0.8 MG 1 TABLET: 27-0.8 TAB at 08:04

## 2023-04-12 RX ADMIN — CITALOPRAM HYDROBROMIDE 20 MG: 20 TABLET ORAL at 08:04

## 2023-04-12 RX ADMIN — FERROUS SULFATE TAB 325 MG (65 MG ELEMENTAL FE) 1 EACH: 325 (65 FE) TAB at 08:04

## 2023-04-12 RX ADMIN — TRANEXAMIC ACID 1300 MG: 650 TABLET ORAL at 08:04

## 2023-04-12 NOTE — PLAN OF CARE
Pt VSS. Pain controlled with scheduled oral pain medication. Breastfeeding, pumping, and supplementing with donor milk. Fundus firm and midline with light lochia rubra. Voiding spontaneously with adequate output. Passing gas. DCT and LCT done. No concerns at this time. Pt to be discharged home.    Problem: Adult Inpatient Plan of Care  Goal: Plan of Care Review  Outcome: Met  Goal: Patient-Specific Goal (Individualized)  Outcome: Met  Goal: Absence of Hospital-Acquired Illness or Injury  Outcome: Met  Goal: Optimal Comfort and Wellbeing  Outcome: Met  Goal: Readiness for Transition of Care  Outcome: Met     Problem:  Fall Injury Risk  Goal: Absence of Fall, Infant Drop and Related Injury  Outcome: Met     Problem: Infection  Goal: Absence of Infection Signs and Symptoms  Outcome: Met     Problem: Bleeding (Labor)  Goal: Hemostasis  Outcome: Met     Problem: Change in Fetal Wellbeing (Labor)  Goal: Stable Fetal Wellbeing  Outcome: Met     Problem: Delayed Labor Progression (Labor)  Goal: Effective Progression to Delivery  Outcome: Met     Problem: Infection (Labor)  Goal: Absence of Infection Signs and Symptoms  Outcome: Met     Problem: Labor Pain (Labor)  Goal: Acceptable Pain Control  Outcome: Met     Problem: Uterine Tachysystole (Labor)  Goal: Normal Uterine Contraction Pattern  Outcome: Met     Problem: Breastfeeding  Goal: Effective Breastfeeding  Outcome: Met     Problem: Skin Injury Risk Increased  Goal: Skin Health and Integrity  Outcome: Met

## 2023-04-12 NOTE — DISCHARGE SUMMARY
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 now , PPD #2 who was admitted on 4/10/2023 at 39w0d for IOL. Patient was subsequently admitted to labor and delivery unit with signed consents. Pregnancy complicated by hemophilia A carrier status - strict heme/onc guidelines in chart and followed.     Labor course was uncomplicated and resulted in  without complications. Please see delivery note for further details.     Her postpartum course was uncomplicated. On discharge day, patient's pain is controlled with oral pain medications. Pt is tolerating ambulation without SOB or CP, and regular diet without N/V. Reports lochia is mild. Denies any HA, vision changes, F/C, LE swelling. Denies any breast pain/soreness.    Pt in stable condition and ready for discharge. She has been instructed to start and/or continue medications and follow up with her obstetrics provider as listed below.    Pertinent studies:  CBC  Recent Labs   Lab 04/10/23  0047 23  0712   WBC 10.00 15.92*   HGB 11.7* 10.6*   HCT 35.0* 31.8*   MCV 93 94    144*          Immunization History   Administered Date(s) Administered    COVID-19 Vaccine 2021    DTaP 1996, 1996, 1996, 1997, 2000    HIB 1996, 1996, 1996, 1997    Hepatitis A, Pediatric/Adolescent, 2 Dose 2009    Hepatitis B, Adult 2019, 2019    Hepatitis B, Pediatric/Adolescent 1996, 1996, 1996    IPV 2000    Influenza - Quadrivalent - PF *Preferred* (6 months and older) 2005, 10/23/2006,  09/08/2016, 09/20/2017, 09/11/2018, 03/05/2019, 10/05/2020, 11/02/2021, 10/25/2022    Influenza Whole 11/17/2005, 10/23/2006, 09/08/2016    MMR 03/24/1997, 05/18/2000, 03/03/2017    Meningococcal Conjugate (MCV4O) 05/12/2014    Meningococcal Conjugate (MCV4P) 07/20/2009    OPV 1996, 1996, 07/16/1997    Rho (D) Immune Globulin 02/01/2022, 01/24/2023    Rho (D) Immune Globulin - IM 04/22/2022    Tdap 07/20/2009, 01/27/2022, 01/24/2023    Varicella 05/18/2000, 07/20/2009        Delivery:    Episiotomy: None   Lacerations:     Repair suture:     Repair # of packets: 2   Blood loss (ml):       Birth information:  YOB: 2022   Time of birth: 6:35 PM   Sex: male   Delivery type: Vaginal, Spontaneous   Gestational Age: 39w0d    Delivery Clinician:      Other providers:       Additional  information:  Forceps:    Vacuum:    Breech:    Observed anomalies      Living?:           APGARS  One minute Five minutes Ten minutes   Skin color:         Heart rate:         Grimace:         Muscle tone:         Breathing:         Totals: 8  9        Placenta: Delivered:       appearance    Patient Instructions:   Current Discharge Medication List        START taking these medications    Details   docusate sodium (COLACE) 100 MG capsule Take 1 capsule (100 mg total) by mouth 2 (two) times daily as needed for Constipation.  Qty: 60 capsule, Refills: 0      ibuprofen (ADVIL,MOTRIN) 600 MG tablet Take 1 tablet (600 mg total) by mouth every 6 (six) hours.  Qty: 60 tablet, Refills: 1           CONTINUE these medications which have NOT CHANGED    Details   citalopram (CELEXA) 20 MG tablet Take 1 tablet (20 mg total) by mouth once daily.  Qty: 90 tablet, Refills: 0    Associated Diagnoses: Anxiety      ferrous sulfate 325 (65 FE) MG EC tablet Take 1 tablet (325 mg total) by mouth once daily.  Qty: 30 tablet, Refills: 1    Associated Diagnoses: Iron deficiency      nystatin (MYCOSTATIN) powder Apply to affected area 3  times daily  Qty: 60 g, Refills: 0    Comments: cannot break bottle  Associated Diagnoses: Skin yeast infection      prenatal 25/iron fum/folic/dha (PRENATAL-1 ORAL) Take by mouth.      tranexamic acid (LYSTEDA) 650 mg tablet Take 2 tablets (1,300 mg total) by mouth 3 (three) times daily for 14 days  Qty: 84 tablet, Refills: 0             Discharge Procedure Orders   Diet Adult Regular        Follow-up Information       Maren Moon MD Follow up in 2 week(s).    Specialty: Obstetrics and Gynecology  Why: Postpartum follow-up, Mood check  Contact information:  [address]  [address]  [facility] [address] [phone]

## 2023-04-12 NOTE — PROGRESS NOTES
POSTPARTUM PROGRESS NOTE    Subjective:     PPD#: 2   Procedure:    EGA: 39w0d   N/V: No   F/C: No   Abd Pain: Mild, well-controlled with oral pain medication   Lochia: Moderate, soaking <1 pad/hr   Voiding: Yes   Ambulating: Yes   Bowel fnc: Yes   Breastfeeding: Yes- hand expression    Contraception: Per primary OB   Circumcision: Consented.  Examined and approved by OB attending.  Baby negative for hemophilia - medically cleared by peds      Objective:      Temp:  [97.5 °F (36.4 °C)-98.2 °F (36.8 °C)] 97.5 °F (36.4 °C)  Pulse:  [70-78] 78  Resp:  [18] 18  SpO2:  [97 %-98 %] 98 %  BP: (90-94)/(49-54) 90/51    Lung: Normal respiratory effort   Abdomen: Soft, appropriately tender   Uterus: Firm, no fundal tenderness   : Deferred   Extremities: Bilateral trace edema     Lab Review    Recent Labs   Lab 04/10/23  0047 23  0712   WBC 10.00 15.92*   HGB 11.7* 10.6*   HCT 35.0* 31.8*   MCV 93 94    144*       Assessment and Plan:   Leticia Mcfadden is a 28 y.o. yo  s/p  PPD #2 with maternal hemophelia A carrier status, Rh neg     Hemophelia A carrier  - s/p pre-delivery TXA  - Continue TXA 1300mg PO TID x 14 days postpartum today  - Hemofil M post-delivery dose due today at noon.   - Factor VIII check at 24h postpartum still pending; will repeat at 72 hours (outpatient lab) per heme recs  - H/H stable, No symptoms of anemia  - Baby work up negative for hemophilia - discussed with peds, cleared for circ.  Baby examined and cleared, consents signed.     2. Rh neg  -Baby also neg, rhogam not indicated    3. Mood Disorder  - Mood stable  - Medications: Celexa   - Will need 1-2 week postpartum mood check    4. Postpartum care  -Patient doing well and meeting appropriate milestones  -Pain controlled  -Stable for discharge home this afternoon, after dose of Hemofil M.  Will plan outpatient factor VIII draw tomorrow late afternoon (order placed).     Samara Neff MD

## 2023-04-12 NOTE — LACTATION NOTE
04/12/23 0900   Breasts WDL   Breast WDL WDL   Breast Pumping   Breast Pumping double electric breast pump utilized   Breast Pumping Interventions post-feed pumping encouraged   Maternal Feeding Assessment   Latch Assistance yes   Maternal Emotional State assist needed   Infant Positioning clutch/football;cross-cradle   Signs of Milk Transfer infant jaw motion present   Pain with Feeding no   Comfort Measures Before/During Feeding infant position adjusted;latch adjusted;maternal position adjusted   Reproductive Interventions   Breast Care: Breastfeeding lanolin to nipples   Breastfeeding Assistance assisted with positioning;feeding cue recognition promoted;feeding on demand promoted;infant latch-on verified;hand expression verified;feeding session observed;infant stimulated to wakeful state;support offered;supplemental feeding provided   Breastfeeding Support maternal rest encouraged;maternal nutrition promoted;maternal hydration promoted;lactation counseling provided;infant-mother separation minimized;encouragement provided;diary/feeding log utilized     Lactation rounds. Lactation discharge education reviewed. LC assisted pt with positioning herself and infant for optimal latch. Infant placed football hold , some on and off then sustained latch, with some effective and rhythmic nursing. Tires. Changed to cross cradle hold right breast, on and off , does not sustain well starting to get frustrated and fussy. Offered supplement via bottle using slow flow nipple. Pt to pump post feeding. Infant suck is inconsistent to start then some improvement, with more effective and rhythmic sucks. Tires and needs supplement with bottle . Inconsistent on the bottle  with some on  and off , needs support. Mother reports improvement over yesterday. Continue to wear bra shells  Plan:   Nurse the baby on cue. Use breast compression and infant stimulation to continue the feeding.   Supplement with ebm/formula as needed using slow  flow nipple.   Pump both breast post feeding for stimulation  Seek SLP referral for Out Patient  [facility] for lactation Scheduled for latch assessment, pre and post feed weight 4-20-23

## 2023-04-13 ENCOUNTER — PATIENT MESSAGE (OUTPATIENT)
Dept: OBSTETRICS AND GYNECOLOGY | Facility: OTHER | Age: 28
End: 2023-04-13
Payer: COMMERCIAL

## 2023-04-13 ENCOUNTER — LAB VISIT (OUTPATIENT)
Dept: LAB | Facility: HOSPITAL | Age: 28
End: 2023-04-13
Attending: OBSTETRICS & GYNECOLOGY
Payer: COMMERCIAL

## 2023-04-13 DIAGNOSIS — Z14.01 HEMOPHILIA A CARRIER, ASYMPTOMATIC: ICD-10-CM

## 2023-04-13 PROCEDURE — 36415 COLL VENOUS BLD VENIPUNCTURE: CPT | Performed by: OBSTETRICS & GYNECOLOGY

## 2023-04-13 PROCEDURE — 85240 CLOT FACTOR VIII AHG 1 STAGE: CPT | Performed by: OBSTETRICS & GYNECOLOGY

## 2023-04-14 ENCOUNTER — TELEPHONE (OUTPATIENT)
Dept: OBSTETRICS AND GYNECOLOGY | Facility: CLINIC | Age: 28
End: 2023-04-14
Payer: COMMERCIAL

## 2023-04-14 LAB — FACT VIII ACT/NOR PPP: 66 % (ref 60–170)

## 2023-04-14 NOTE — TELEPHONE ENCOUNTER
----- Message from Hema Thomas MA sent at 4/11/2023  4:50 PM CDT -----  ZAFAR An Staff  Caller: Unspecified (Today,  2:14 PM)  Hi, can you please call ms carrizales to set up a post partum mood check appt . Thank you

## 2023-04-17 ENCOUNTER — TELEPHONE (OUTPATIENT)
Dept: OBSTETRICS AND GYNECOLOGY | Facility: CLINIC | Age: 28
End: 2023-04-17
Payer: COMMERCIAL

## 2023-04-17 NOTE — TELEPHONE ENCOUNTER
----- Message from Maren Moon MD sent at 4/17/2023 12:24 PM CDT -----  Can you please let her know that the laborist messaged me that her Factor 8 level was a little low postpartum at 66% when suggested range day 3-7 was %. Can you see if she saw her hematologist yet since delivery and find out any updates in care plan. Thanks!  ----- Message -----  From: Samara Neff MD  Sent: 4/16/2023  11:00 PM CDT  To: Maren Moon MD    Hey, I just wanted to make sure you saw this patient's labs.  I don't know if we need not contact heme/onc, but their recommendations said to keep her Factor 8 levels between  on PP days 3-7, but they were only 66%.  I had put the order in, so it came to my inbasket, but I just wanted to make sure you saw it too.   Thanks.

## 2023-04-17 NOTE — TELEPHONE ENCOUNTER
Advised per Dr. Moon. Pt has not reached out to her Hematologist since delivery.  States she is not bleeding heavily or having any symptoms at this time.    Advised to reach out to them to let them know her Factor 8 levels and to see if any interventions are needed at this time and to update us.

## 2023-04-19 ENCOUNTER — POSTPARTUM VISIT (OUTPATIENT)
Dept: OBSTETRICS AND GYNECOLOGY | Facility: CLINIC | Age: 28
End: 2023-04-19
Payer: COMMERCIAL

## 2023-04-19 VITALS — WEIGHT: 129 LBS | DIASTOLIC BLOOD PRESSURE: 72 MMHG | SYSTOLIC BLOOD PRESSURE: 114 MMHG | BODY MASS INDEX: 24.37 KG/M2

## 2023-04-19 PROCEDURE — 0503F PR POSTPARTUM CARE VISIT: ICD-10-PCS | Mod: CPTII,S$GLB,, | Performed by: OBSTETRICS & GYNECOLOGY

## 2023-04-19 PROCEDURE — 99999 PR PBB SHADOW E&M-EST. PATIENT-LVL III: ICD-10-PCS | Mod: PBBFAC,,, | Performed by: OBSTETRICS & GYNECOLOGY

## 2023-04-19 PROCEDURE — 99999 PR PBB SHADOW E&M-EST. PATIENT-LVL III: CPT | Mod: PBBFAC,,, | Performed by: OBSTETRICS & GYNECOLOGY

## 2023-04-19 PROCEDURE — 0503F POSTPARTUM CARE VISIT: CPT | Mod: CPTII,S$GLB,, | Performed by: OBSTETRICS & GYNECOLOGY

## 2023-05-17 ENCOUNTER — OFFICE VISIT (OUTPATIENT)
Dept: OBSTETRICS AND GYNECOLOGY | Facility: CLINIC | Age: 28
End: 2023-05-17
Payer: COMMERCIAL

## 2023-05-17 VITALS
HEIGHT: 61 IN | DIASTOLIC BLOOD PRESSURE: 60 MMHG | BODY MASS INDEX: 22.81 KG/M2 | SYSTOLIC BLOOD PRESSURE: 100 MMHG | WEIGHT: 120.81 LBS

## 2023-05-17 DIAGNOSIS — Z30.09 GENERAL COUNSELING AND ADVICE FOR CONTRACEPTIVE MANAGEMENT: ICD-10-CM

## 2023-05-17 PROCEDURE — 99999 PR PBB SHADOW E&M-EST. PATIENT-LVL III: ICD-10-PCS | Mod: PBBFAC,,, | Performed by: OBSTETRICS & GYNECOLOGY

## 2023-05-17 PROCEDURE — 0503F PR POSTPARTUM CARE VISIT: ICD-10-PCS | Mod: CPTII,S$GLB,, | Performed by: OBSTETRICS & GYNECOLOGY

## 2023-05-17 PROCEDURE — 3078F PR MOST RECENT DIASTOLIC BLOOD PRESSURE < 80 MM HG: ICD-10-PCS | Mod: CPTII,S$GLB,, | Performed by: OBSTETRICS & GYNECOLOGY

## 2023-05-17 PROCEDURE — 3074F PR MOST RECENT SYSTOLIC BLOOD PRESSURE < 130 MM HG: ICD-10-PCS | Mod: CPTII,S$GLB,, | Performed by: OBSTETRICS & GYNECOLOGY

## 2023-05-17 PROCEDURE — 1160F PR REVIEW ALL MEDS BY PRESCRIBER/CLIN PHARMACIST DOCUMENTED: ICD-10-PCS | Mod: CPTII,S$GLB,, | Performed by: OBSTETRICS & GYNECOLOGY

## 2023-05-17 PROCEDURE — 3008F BODY MASS INDEX DOCD: CPT | Mod: CPTII,S$GLB,, | Performed by: OBSTETRICS & GYNECOLOGY

## 2023-05-17 PROCEDURE — 0503F POSTPARTUM CARE VISIT: CPT | Mod: CPTII,S$GLB,, | Performed by: OBSTETRICS & GYNECOLOGY

## 2023-05-17 PROCEDURE — 3008F PR BODY MASS INDEX (BMI) DOCUMENTED: ICD-10-PCS | Mod: CPTII,S$GLB,, | Performed by: OBSTETRICS & GYNECOLOGY

## 2023-05-17 PROCEDURE — 1159F MED LIST DOCD IN RCRD: CPT | Mod: CPTII,S$GLB,, | Performed by: OBSTETRICS & GYNECOLOGY

## 2023-05-17 PROCEDURE — 3078F DIAST BP <80 MM HG: CPT | Mod: CPTII,S$GLB,, | Performed by: OBSTETRICS & GYNECOLOGY

## 2023-05-17 PROCEDURE — 1159F PR MEDICATION LIST DOCUMENTED IN MEDICAL RECORD: ICD-10-PCS | Mod: CPTII,S$GLB,, | Performed by: OBSTETRICS & GYNECOLOGY

## 2023-05-17 PROCEDURE — 99999 PR PBB SHADOW E&M-EST. PATIENT-LVL III: CPT | Mod: PBBFAC,,, | Performed by: OBSTETRICS & GYNECOLOGY

## 2023-05-17 PROCEDURE — 1160F RVW MEDS BY RX/DR IN RCRD: CPT | Mod: CPTII,S$GLB,, | Performed by: OBSTETRICS & GYNECOLOGY

## 2023-05-17 PROCEDURE — 3074F SYST BP LT 130 MM HG: CPT | Mod: CPTII,S$GLB,, | Performed by: OBSTETRICS & GYNECOLOGY

## 2023-06-24 NOTE — PROGRESS NOTES
Subjective:      Leticia Mcfadden is a 28 y.o.  who presents for a postpartum visit.  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  Her hospitalization was not complicated.  She is breastfeeding.  She is undecided about contraception. She reports mood is good without anxiety or depression on Citalopram.      Denies pain, abnormal vaginal bleeding or discharge. Normal bladder and bowel function. Baby girl doing well.    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        Current Outpatient Medications:     citalopram (CELEXA) 20 MG tablet, Take 1 tablet (20 mg total) by mouth once daily., Disp: 90 tablet, Rfl: 0    docusate sodium (COLACE) 100 MG capsule, Take 1 capsule (100 mg total) by mouth 2 (two) times daily as needed for Constipation., Disp: 60 capsule, Rfl: 0    ibuprofen (ADVIL,MOTRIN) 600 MG tablet, Take 1 tablet (600 mg total) by mouth every 6 (six) hours., Disp: 60 tablet, Rfl: 1    nystatin (MYCOSTATIN) powder, Apply to affected area 3 times daily, Disp: 60 g, Rfl: 0    prenatal 25/iron fum/folic/dha (PRENATAL-1 ORAL), Take by mouth., Disp: , Rfl:       Objective:     Vitals:    23 1126   BP: 114/72       APPEARANCE: Well nourished, well developed, in no acute distress.  PSYCH: Appropriate mood and affect.  NECK:  Supple, no thyromegaly.  BREASTS:  No masses, no nipple discharge.  CARDIOVASCULAR:  Regular rate and rhythm.  LUNGS:  Clear to auscultation bilaterally.  ABDOMEN:  Soft, nontender.  Pfannenstiel incision C/D/I, healing well.    GENITOURINARY:  Deferred.  EXTREMITIES: No edema.    Postpartum depression score: 3    Assessment:     Postpartum care following vaginal delivery      Plan:     1. Postpartum course reviewed and discussed with patient.  All questions answered.  Return to clinic in 4 weeks for postpartum visit.        Maren Moon MD

## 2023-07-03 ENCOUNTER — PATIENT MESSAGE (OUTPATIENT)
Dept: OBSTETRICS AND GYNECOLOGY | Facility: CLINIC | Age: 28
End: 2023-07-03
Payer: COMMERCIAL

## 2023-07-03 DIAGNOSIS — F41.9 ANXIETY: ICD-10-CM

## 2023-07-03 RX ORDER — CITALOPRAM 20 MG/1
20 TABLET, FILM COATED ORAL DAILY
Qty: 90 TABLET | Refills: 3 | Status: SHIPPED | OUTPATIENT
Start: 2023-07-03 | End: 2024-07-02

## 2023-07-05 NOTE — PROGRESS NOTES
Subjective:      Leticia Mcfadden is a 28 y.o.  who presents for a postpartum visit.  She is status post  uncomplicated vaginal delivery 5 weeks ago.  Her hospitalization was not complicated.  She is breastfeeding.  She desires Paragard for contraception.  She reports mood is good without anxiety or depression on Celexa.      Patient denies pain, abnormal bleeding or discharge. Normal bowel and bladder function. Baby doing well.    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  PSYCH: Appropriate mood and affect.  NECK:  Supple, no thyromegaly.  BREASTS:  No masses, no nipple discharge.  CARDIOVASCULAR:  Regular rate and rhythm.  LUNGS:  Clear to auscultation bilaterally.  ABDOMEN:  Soft, nontender.  GENITOURINARY:  External genitalia normal in appearance, normal perineal body, normal urethral meatus.  Vagina with scant discharge, no blood.  No lesions.  Cervix without lesion, C/T/H.  Uterus mobile, nontender, normal in size.  Adnexa without masses or TTP.    EXTREMITIES: No edema.    Postpartum depression score: 3      Assessment:     Postpartum care following vaginal delivery    General counseling and advice for contraceptive management  -     Device Authorization Order: Paragard    Plan:     1. Postpartum course reviewed and discussed with patient.  All questions answered.  Return to clinic in 1 year for annual exam      Maren Moon MD

## 2023-10-30 ENCOUNTER — TELEPHONE (OUTPATIENT)
Dept: OBSTETRICS AND GYNECOLOGY | Facility: CLINIC | Age: 28
End: 2023-10-30
Payer: COMMERCIAL

## 2023-10-30 NOTE — TELEPHONE ENCOUNTER
Attempted to contact pt to inform her that her IUD device was not covered for her upcoming appointment. Pt did not answer. Lm to call office

## 2024-07-25 DIAGNOSIS — F41.9 ANXIETY: ICD-10-CM

## 2024-07-25 RX ORDER — CITALOPRAM 20 MG/1
20 TABLET, FILM COATED ORAL DAILY
Qty: 90 TABLET | Refills: 0 | Status: SHIPPED | OUTPATIENT
Start: 2024-07-25

## 2024-07-25 NOTE — TELEPHONE ENCOUNTER
Leticia Mcfadden  is requesting a refill authorization.  Brief Assessment and Rationale for Refill:  Approve     Medication Therapy Plan:  FOVS in 2 months; LOV <15mths;  Patient has been taking/post partum 1 year      Pharmacist review requested: Yes   Extended chart review required: Yes   Comments:     Note composed:12:23 PM 07/25/2024

## 2024-07-25 NOTE — TELEPHONE ENCOUNTER
Refill Routing Note   Medication(s) are not appropriate for processing by [facility] for the following reason(s):        Drug-disease interaction    [facility] action(s):  Defer        Medication Therapy Plan: FOVS in 2 months; LOV <15mths; Pregnancy Warning: citalopram ( no previous provider override) Patient has been taking/post partum 1 year    Pharmacist review requested: Yes     Appointments  past 12m or future 3m with PCP    Date Provider   Last Visit   5/17/2023 Maren Moon MD   Next Visit   9/24/2024 Maren Moon MD   ED visits in past 90 days: 0        Note composed:7:32 AM 07/25/2024